# Patient Record
Sex: MALE | Race: WHITE | NOT HISPANIC OR LATINO | Employment: PART TIME | URBAN - METROPOLITAN AREA
[De-identification: names, ages, dates, MRNs, and addresses within clinical notes are randomized per-mention and may not be internally consistent; named-entity substitution may affect disease eponyms.]

---

## 2017-09-27 ENCOUNTER — OFFICE VISIT (OUTPATIENT)
Dept: SURGERY | Facility: CLINIC | Age: 22
End: 2017-09-27
Payer: COMMERCIAL

## 2017-09-27 VITALS
SYSTOLIC BLOOD PRESSURE: 117 MMHG | BODY MASS INDEX: 23.59 KG/M2 | HEART RATE: 75 BPM | WEIGHT: 178 LBS | DIASTOLIC BLOOD PRESSURE: 66 MMHG | HEIGHT: 73 IN

## 2017-09-27 DIAGNOSIS — K64.9 HEMORRHOIDS, UNSPECIFIED HEMORRHOID TYPE: Primary | ICD-10-CM

## 2017-09-27 PROCEDURE — 99203 OFFICE O/P NEW LOW 30 MIN: CPT | Performed by: SURGERY

## 2017-09-27 NOTE — MR AVS SNAPSHOT
"              After Visit Summary   2017    Edvin Tuttle    MRN: [id number]           Patient Information     Date Of Birth          1995        Visit Information        Provider Department      2017 1:00 PM Magan Rodríguez MD [facility]        Today's Diagnoses     Hemorrhoids, unspecified hemorrhoid type    -  1      Care Instructions    Per Physician's instructions            Follow-ups after your visit        Who to contact     If you have questions or need follow up information about today's clinic visit or your schedule please contact [facility] directly at [phone].  Normal or non-critical lab and imaging results will be communicated to you by ComplyMDhart, letter or phone within 4 business days after the clinic has received the results. If you do not hear from us within 7 days, please contact the clinic through ComplyMDhart or phone. If you have a critical or abnormal lab result, we will notify you by phone as soon as possible.  Submit refill requests through [employer] or call your pharmacy and they will forward the refill request to us. Please allow 3 business days for your refill to be completed.          Additional Information About Your Visit        MyChart Information     [employer] lets you send messages to your doctor, view your test results, renew your prescriptions, schedule appointments and more. To sign up, go to [email or web address] . Click on \"Log in\" on the left side of the screen, which will take you to the Welcome page. Then click on \"Sign up Now\" on the right side of the page.     You will be asked to enter the access code listed below, as well as some personal information. Please follow the directions to create your username and password.     Your access code is: [email or web address]  Expires: 2017  1:31 PM     Your access code will  in 90 days. If you need help or a new code, please call your [facility] or [phone].        Care EveryWhere " "ID     This is your Care EveryWhere ID. This could be used by other organizations to access your [place] medical records  [id number]        Your Vitals Were     Pulse Height BMI (Body Mass Index)             75 1.854 m (6' 1\") 23.48 kg/m2          Blood Pressure from Last 3 Encounters:   09/27/17 117/66   01/16/13 144/78   01/04/13 114/72    Weight from Last 3 Encounters:   09/27/17 80.7 kg (178 lb)   01/04/13 91.1 kg (200 lb 12.8 oz) (95 %)*   10/20/11 88.2 kg (194 lb 6.4 oz) (96 %)*     * Growth percentiles are based on CDC 2-20 Years data.              Today, you had the following     No orders found for display       Primary Care Provider Office Phone # Fax #    Zach Robison [phone] [address] [facility] [address]        Equal Access to Services     CLARITZA TRONCOSO : Hadii aad ku hadasho Soomaali, waaxda luqadaha, qaybta kaalmada adeegyada, waxay charlie valencia . So [facility] [phone].    ATENCIÓN: Si habla español, tiene a renteria disposición servicios gratuitos de asistencia lingüística. Llame al [phone].    We comply with applicable federal civil rights laws and [place] laws. We do not discriminate on the basis of race, color, national origin, age, disability sex, sexual orientation or gender identity.            Thank you!     Thank you for choosing [facility]  for your care. Our goal is always to provide you with excellent care. Hearing back from our patients is one way we can continue to improve our services. Please take a few minutes to complete the written survey that you may receive in the mail after your visit with us. Thank you!             Your Updated Medication List - Protect others around you: Learn how to safely use, store and throw away your medicines at www.disposemymeds.org.          This list is accurate as of: 9/27/17  1:31 PM.  Always use your most recent med list.                   Brand Name Dispense Instructions " for use Diagnosis    NO ACTIVE MEDICATIONS

## 2017-09-27 NOTE — NURSING NOTE
"Chief Complaint   Patient presents with     Consult     abscess        Initial /66 (BP Location: Left arm, Patient Position: Chair, Cuff Size: Adult Regular)  Pulse 75  Ht 1.854 m (6' 1\")  Wt 80.7 kg (178 lb)  BMI 23.48 kg/m2 Estimated body mass index is 23.48 kg/(m^2) as calculated from the following:    Height as of this encounter: 1.854 m (6' 1\").    Weight as of this encounter: 80.7 kg (178 lb).  BP completed using cuff size: regular      Marva Peter CMA     "

## 2017-09-27 NOTE — PROGRESS NOTES
Patient referred by Dr. Robison  for evaluation of perianal pain.    22-year-old male complaining of weeklong history of perianal pain. Patient reports the pain came on slowly but is constant. Localized discomfort 1 out of 10 without radiation. Patient has never been diagnosed with hemorrhoids and is concerned this could be an abscess. He denies fevers chills nausea and vomiting. Patient reports normal bowel movements but does not take fiber supplements.    Patient Active Problem List   Diagnosis     Pneumonia       History reviewed. No pertinent past medical history.    History reviewed. No pertinent surgical history.    Family History   Problem Relation Age of Onset     Musculoskeletal Disorder Father      ALS     Alzheimer Disease Maternal Grandmother      CANCER Maternal Grandfather      lung     CANCER Paternal Grandfather      Lung       Social History   Substance Use Topics     Smoking status: Never Smoker     Smokeless tobacco: Never Used      Comment: Parents smoke outside.     Alcohol use No        History   Drug Use No       Current Outpatient Prescriptions   Medication Sig Dispense Refill     NO ACTIVE MEDICATIONS          No Known Allergies    ROS  CV - No CP or SOB  Resp - No SOB or dyspnea  GI - No nausea or vomiting   - No difficulty with urination      Exam:  AXO3 NAD  Neuro - Strength 5/5 all major groups, sensation intact, PERRL  Lungs - CTA  CV - RRR  Abd - Soft, non-distended, non-tender, +BS  Rectal-slightly enlarged external hemorrhoid, tender to palpation, no surrounding erythema.  Extr - No edema    Assessment and plan: 22-year-old male with enlarged external hemorrhoid. I explained the pathophysiology of hemorrhoid formation and the importance of fiber in the diet. We talked about sitz baths as Preparation H Anusol and Tucks pads. I explained that this hemorrhoid is not thrombosed as that would be quite painful. I also stated that surgery is a last choice option for hemorrhoids. Patient  understood and will start a high-fiber diet and use sitz baths to control this.    Magan Rodríguez MD

## 2017-10-16 ENCOUNTER — OFFICE VISIT (OUTPATIENT)
Dept: FAMILY MEDICINE | Facility: CLINIC | Age: 22
End: 2017-10-16
Payer: COMMERCIAL

## 2017-10-16 VITALS
OXYGEN SATURATION: 97 % | WEIGHT: 177 LBS | BODY MASS INDEX: 23.46 KG/M2 | HEIGHT: 73 IN | DIASTOLIC BLOOD PRESSURE: 70 MMHG | SYSTOLIC BLOOD PRESSURE: 136 MMHG | TEMPERATURE: 98.2 F | HEART RATE: 77 BPM

## 2017-10-16 DIAGNOSIS — R07.0 THROAT PAIN: ICD-10-CM

## 2017-10-16 DIAGNOSIS — J01.90 ACUTE SINUSITIS WITH SYMPTOMS > 10 DAYS: Primary | ICD-10-CM

## 2017-10-16 DIAGNOSIS — R06.09 DOE (DYSPNEA ON EXERTION): ICD-10-CM

## 2017-10-16 DIAGNOSIS — J30.2 CHRONIC SEASONAL ALLERGIC RHINITIS, UNSPECIFIED TRIGGER: ICD-10-CM

## 2017-10-16 LAB
DEPRECATED S PYO AG THROAT QL EIA: NORMAL
SPECIMEN SOURCE: NORMAL

## 2017-10-16 PROCEDURE — 87880 STREP A ASSAY W/OPTIC: CPT | Performed by: INTERNAL MEDICINE

## 2017-10-16 PROCEDURE — 87081 CULTURE SCREEN ONLY: CPT | Performed by: INTERNAL MEDICINE

## 2017-10-16 PROCEDURE — 99214 OFFICE O/P EST MOD 30 MIN: CPT | Performed by: INTERNAL MEDICINE

## 2017-10-16 RX ORDER — FLUTICASONE PROPIONATE 50 MCG
1-2 SPRAY, SUSPENSION (ML) NASAL DAILY
Qty: 1 BOTTLE | Refills: 11 | Status: SHIPPED | OUTPATIENT
Start: 2017-10-16 | End: 2020-03-03

## 2017-10-16 NOTE — NURSING NOTE
"Chief Complaint   Patient presents with     Pharyngitis     x 1.5 weeks, fever at the onset. - wondering about asthma       Initial /70 (BP Location: Left arm, Patient Position: Chair, Cuff Size: Adult Regular)  Pulse 77  Temp 98.2  F (36.8  C) (Tympanic)  Ht 6' 1\" (1.854 m)  Wt 177 lb (80.3 kg)  SpO2 97%  BMI 23.35 kg/m2 Estimated body mass index is 23.35 kg/(m^2) as calculated from the following:    Height as of this encounter: 6' 1\" (1.854 m).    Weight as of this encounter: 177 lb (80.3 kg).  Medication Reconciliation: complete   Nahomi JACK CMA ([facility])    "

## 2017-10-16 NOTE — PATIENT INSTRUCTIONS
I have ordered a lung function test (PFT).  Once you are feeling back to baseline, please call [phone] to schedule this.      Try some Flonase to help your sinuses.  When spraying into the nose, aim towards the ears for the best effect.      Check with insurance about allergy testing- if you want to pursue this in the future, let me know and I can place a referral to the allergist.

## 2017-10-16 NOTE — MR AVS SNAPSHOT
After Visit Summary   10/16/2017    Edvin Tuttle    MRN: [id number]           Patient Information     Date Of Birth          1995        Visit Information        Provider Department      10/16/2017 11:20 AM Hill Arechiga MD [facility]        Today's Diagnoses     Throat pain    -  1    SU (dyspnea on exertion)        Chronic seasonal allergic rhinitis, unspecified trigger        Acute sinusitis with symptoms > 10 days          Care Instructions    I have ordered a lung function test (PFT).  Once you are feeling back to baseline, please call [phone] to schedule this.      Try some Flonase to help your ear drain.  When spraying into the nose, aim towards the ears for the best effect.      Check with insurance about allergy testing- if you want to pursue this in the future, let me know and I can place a referral to the allergist.            Follow-ups after your visit        Future tests that were ordered for you today     Open Future Orders        Priority Expected Expires Ordered    General PFT Lab (Please always keep checked) Routine  10/16/2018 10/16/2017    Pulmonary Function Test Routine  1/2/2027 10/16/2017            Who to contact     If you have questions or need follow up information about today's clinic visit or your schedule please contact [facility] directly at [phone].  Normal or non-critical lab and imaging results will be communicated to you by MyChart, letter or phone within 4 business days after the clinic has received the results. If you do not hear from us within 7 days, please contact the clinic through [employer] or phone. If you have a critical or abnormal lab result, we will notify you by phone as soon as possible.  Submit refill requests through [employer] or call your pharmacy and they will forward the refill request to us. Please allow 3 business days for your refill to be completed.          Additional Information About Your Visit    "     MyChart Information     [employer] lets you send messages to your doctor, view your test results, renew your prescriptions, schedule appointments and more. To sign up, go to [email or web address] . Click on \"Log in\" on the left side of the screen, which will take you to the Welcome page. Then click on \"Sign up Now\" on the right side of the page.     You will be asked to enter the access code listed below, as well as some personal information. Please follow the directions to create your username and password.     Your access code is: [email or web address]  Expires: 2017  1:31 PM     Your access code will  in 90 days. If you need help or a new code, please call your [place] clinic or [phone].        Care EveryWhere ID     This is your Care EveryWhere ID. This could be used by other organizations to access your [place] medical records  [id number]        Your Vitals Were     Pulse Temperature Height Pulse Oximetry BMI (Body Mass Index)       77 98.2  F (36.8  C) (Tympanic) 6' 1\" (1.854 m) 97% 23.35 kg/m2        Blood Pressure from Last 3 Encounters:   10/16/17 136/70   17 117/66   13 144/78    Weight from Last 3 Encounters:   10/16/17 177 lb (80.3 kg)   17 178 lb (80.7 kg)   13 200 lb 12.8 oz (91.1 kg) (95 %)*     * Growth percentiles are based on [facility] 2-20 Years data.              We Performed the Following     Beta strep group A culture     Strep, Rapid Screen          Today's Medication Changes          These changes are accurate as of: 10/16/17 11:47 AM.  If you have any questions, ask your nurse or doctor.               Start taking these medicines.        Dose/Directions    amoxicillin-clavulanate 875-125 MG per tablet   Commonly known as:  AUGMENTIN   Used for:  Acute sinusitis with symptoms > 10 days   Started by:  Hill Arechiga MD        Dose:  1 tablet   Take 1 tablet by mouth 2 times daily for 7 days   Quantity:  14 tablet   Refills:  0       fluticasone 50 MCG/ACT " spray   Commonly known as:  FLONASE   Used for:  Chronic seasonal allergic rhinitis, unspecified trigger   Started by:  Hill Arechiga MD        Dose:  1-2 spray   Spray 1-2 sprays into both nostrils daily   Quantity:  1 Bottle   Refills:  11            Where to get your medicines      These medications were sent to [facility] - [place], [place] - [address]  [address] [address]     Phone:  [phone]     amoxicillin-clavulanate 875-125 MG per tablet    fluticasone 50 MCG/ACT spray                Primary Care Provider Office Phone # Fax #    Zach Deonte Robison [phone]       [address] [facility] [address]        Equal Access to Services     CLARITZA TRONCOSO : Hadii gloria joeo Sodom, waaxda luqadaha, qaybta kaalmada adeegyada, magdalena padilla. So [facility] [phone].    ATENCIÓN: Si habla español, tiene a renteria disposición servicios gratuitos de asistencia lingüística. [facility] [phone].    We comply with applicable federal civil rights laws and [place] laws. We do not discriminate on the basis of race, color, national origin, age, disability, sex, sexual orientation, or gender identity.            Thank you!     Thank you for choosing [facility]  for your care. Our goal is always to provide you with excellent care. Hearing back from our patients is one way we can continue to improve our services. Please take a few minutes to complete the written survey that you may receive in the mail after your visit with us. Thank you!             Your Updated Medication List - Protect others around you: Learn how to safely use, store and throw away your medicines at www.disposemymeds.org.          This list is accurate as of: 10/16/17 11:47 AM.  Always use your most recent med list.                   Brand Name Dispense Instructions for use Diagnosis    amoxicillin-clavulanate 875-125 MG per tablet    AUGMENTIN    14  tablet    Take 1 tablet by mouth 2 times daily for 7 days    Acute sinusitis with symptoms > 10 days       fluticasone 50 MCG/ACT spray    FLONASE    1 Bottle    Spray 1-2 sprays into both nostrils daily    Chronic seasonal allergic rhinitis, unspecified trigger       NO ACTIVE MEDICATIONS

## 2017-10-16 NOTE — PROGRESS NOTES
SUBJECTIVE:   Edvin Tuttle is a 22 year old male who presents to clinic today for the following health issues:  Chief Complaint   Patient presents with     Pharyngitis     x 1.5 weeks, fever at the onset. - wondering about asthma       ENT Symptoms             Symptoms: cc Present Absent Comment   Fever/Chills   x Fever at the onset    Fatigue  x     Muscle Aches  x  At the onset, not so bad now    Eye Irritation  x  itchy   Sneezing  x     Nasal Joey/Drg  x     Sinus Pressure/Pain  x     Loss of smell  x     Dental pain   x    Sore Throat x x  Losing voice    Swollen Glands       Ear Pain/Fullness   x    Cough  x     Wheeze  x     Chest Pain   x    Shortness of breath  x     Rash   x    Other  x  Headache      Symptom duration:  1.5 weeks    Symptom severity:     Treatments tried:  Nyquil, Mucin-ex    Contacts:  works at [employer] school        Edvin has a history of seasonal allergies but has not had formal testing.  Has not found much relief in OTC antihistamines, has never tried a nasal spray.  He also wonders if he may have mild asthma.  Has a history of SOB with exercise that is greater than he would expect.  Has never had PFTs or used an inhaler.      Problem list and histories reviewed & adjusted, as indicated.  Additional history: as documented    Current Outpatient Prescriptions   Medication Sig Dispense Refill     fluticasone (FLONASE) 50 MCG/ACT spray Spray 1-2 sprays into both nostrils daily 1 Bottle 11     amoxicillin-clavulanate (AUGMENTIN) 875-125 MG per tablet Take 1 tablet by mouth 2 times daily for 7 days 14 tablet 0     NO ACTIVE MEDICATIONS        No Known Allergies    Reviewed and updated as needed this visit by clinical staffTobacco  Allergies  Med Hx  Surg Hx  Fam Hx  Soc Hx      Reviewed and updated as needed this visit by Provider         ROS:  Constitutional, HEENT, pulmonary systems are negative, except as otherwise noted.      OBJECTIVE:   /70 (BP Location: Left arm,  "Patient Position: Chair, Cuff Size: Adult Regular)  Pulse 77  Temp 98.2  F (36.8  C) (Tympanic)  Ht 6' 1\" (1.854 m)  Wt 177 lb (80.3 kg)  SpO2 97%  BMI 23.35 kg/m2  Body mass index is 23.35 kg/(m^2).    GENERAL: alert and no distress  EYES: Eyes grossly normal to inspection, PERRL and conjunctivae and sclerae normal  HENT: ear canals and TMs normal, sinuses mildly tender to percussion, nose and mouth without ulcers or lesions, oropharynx red but no tonsillar exudates  NECK: no adenopathy, no asymmetry, masses, or scars  RESP: lungs clear to auscultation - no rales, rhonchi or wheezes  CV: regular rate and rhythm, normal S1 S2, no S3 or S4, no murmur, click or rub      ASSESSMENT/PLAN:       1. Acute sinusitis with symptoms > 10 days  2. Throat pain    Edvin presents with upper respiratory and sinus symptoms of 1.5 weeks duration, so we will treat with Augmentin for possible bacterial sinusitis.  Rapid strep was negative.    - amoxicillin-clavulanate (AUGMENTIN) 875-125 MG per tablet; Take 1 tablet by mouth 2 times daily for 7 days  Dispense: 14 tablet; Refill: 0  - Strep, Rapid Screen  - Beta strep group A culture    3. SU (dyspnea on exertion)    He reports a long history of SOB with exercise and would like to be tested for asthma- PFTs ordered.    - General PFT Lab (Please always keep checked); Future  - Pulmonary Function Test; Future    4. Chronic seasonal allergic rhinitis, unspecified trigger    Has never been tested for allergies- he is interested in testing but isn't sure if insurance will cover this, so he is going to check.  Has not had much improvement with OTC antihistamines, is currently not taking one.  Recommended trying some Flonase.     - fluticasone (FLONASE) 50 MCG/ACT spray; Spray 1-2 sprays into both nostrils daily  Dispense: 1 Bottle; Refill: 11    Follow-up as needed if not improving.     Hill Arechiga MD  [facility]  "

## 2017-10-17 LAB
BACTERIA SPEC CULT: ABNORMAL
SPECIMEN SOURCE: ABNORMAL

## 2017-12-04 ENCOUNTER — OFFICE VISIT (OUTPATIENT)
Dept: FAMILY MEDICINE | Facility: CLINIC | Age: 22
End: 2017-12-04
Payer: COMMERCIAL

## 2017-12-04 VITALS
WEIGHT: 174 LBS | HEART RATE: 70 BPM | DIASTOLIC BLOOD PRESSURE: 74 MMHG | TEMPERATURE: 96 F | OXYGEN SATURATION: 97 % | SYSTOLIC BLOOD PRESSURE: 106 MMHG | BODY MASS INDEX: 23.06 KG/M2 | HEIGHT: 73 IN

## 2017-12-04 DIAGNOSIS — R05.3 CHRONIC COUGH: Primary | ICD-10-CM

## 2017-12-04 DIAGNOSIS — R07.0 THROAT PAIN: ICD-10-CM

## 2017-12-04 LAB
DEPRECATED S PYO AG THROAT QL EIA: NORMAL
SPECIMEN SOURCE: NORMAL

## 2017-12-04 PROCEDURE — 99213 OFFICE O/P EST LOW 20 MIN: CPT | Performed by: FAMILY MEDICINE

## 2017-12-04 PROCEDURE — 87880 STREP A ASSAY W/OPTIC: CPT | Performed by: FAMILY MEDICINE

## 2017-12-04 PROCEDURE — 87081 CULTURE SCREEN ONLY: CPT | Performed by: FAMILY MEDICINE

## 2017-12-04 RX ORDER — ALBUTEROL SULFATE 90 UG/1
2 AEROSOL, METERED RESPIRATORY (INHALATION) EVERY 4 HOURS PRN
Qty: 1 INHALER | Refills: 3 | Status: SHIPPED | OUTPATIENT
Start: 2017-12-04 | End: 2020-03-03

## 2017-12-04 NOTE — NURSING NOTE
"Chief Complaint   Patient presents with     Pharyngitis       Initial /74 (BP Location: Left arm, Cuff Size: Adult Regular)  Pulse 70  Temp 96  F (35.6  C) (Tympanic)  Ht 6' 1\" (1.854 m)  Wt 174 lb (78.9 kg)  SpO2 97%  BMI 22.96 kg/m2 Estimated body mass index is 22.96 kg/(m^2) as calculated from the following:    Height as of this encounter: 6' 1\" (1.854 m).    Weight as of this encounter: 174 lb (78.9 kg).  Medication Reconciliation: complete  "

## 2017-12-04 NOTE — LETTER
December 5, 2017      Edvin Tuttle  [address] [facility] [address]            The results of your recent throat culture were negative.  If you have any further questions or concerns please contact the clinic            Sincerely,        Gudelia Arriaga MD/ls

## 2017-12-04 NOTE — MR AVS SNAPSHOT
After Visit Summary   12/4/2017    Edvin Tuttle    MRN: [id number]           Patient Information     Date Of Birth          1995        Visit Information        Provider Department      12/4/2017 11:00 AM Gudelia Arriaga MD [facility]        Today's Diagnoses     Throat pain    -  1    Chronic cough          Care Instructions          Thank you for choosing [facility].  You may be receiving a survey in the mail from [employer] regarding your visit today.  Please take a few minutes to complete and return the survey to let us know how we are doing.      If you have questions or concerns, please contact us via PubNub or you can contact your care team at [phone].    Our Clinic hours are:  Monday 6:40 am  to 7:00 pm  Tuesday -Friday 6:40 am to 5:00 pm    The [place] outpatient lab hours are:  Monday - Friday 6:10 am to 4:45 pm  Saturdays 7:00 am to 11:00 am  Appointments are required, call [phone]    If you have clinical questions after hours or would like to schedule an appointment,  call the clinic at [phone].  Allergic Rhinitis  Allergic rhinitis is an allergic reaction that affects the nose, and often the eyes. It s often known as nasal allergies. Nasal allergies are often due to things in the environment that are breathed in. Depending what you are sensitive to, nasal allergies may occur only during certain seasons. Or they may occur year round. Common indoor allergens include house dust mites, mold, cockroaches, and pet dander. Outdoor allergens include pollen from trees, grasses, and weeds.   Symptoms include a drippy, stuffy, and itchy nose. They also include sneezing and red and itchy eyes. You may feel tired more often. Severe allergies may also affect your breathing and trigger a condition called asthma.   Tests can be done to see what allergens are affecting you. You may be referred to an allergy specialist for testing and further  evaluation.  Home care  Your healthcare provider may prescribe medicines to help relieve allergy symptoms. These may include oral medicines, nasal sprays, or eye drops.  Ask your provider for advice on how to avoid substances that you are allergic to. Below are a few tips for each type of allergen.  Pet dander:    Do not have pets with fur and feathers.    If you can't avoid having a pet, keep it out of your bedroom and off upholstered furniture.  Pollen:    When pollen counts are high, keep windows of your car and home closed. If possible, use an air conditioner instead.    Wear a filter mask when mowing or doing yard work.  House dust mites:    Wash bedding every week in warm water and detergent and dry on a hot setting.    Cover the mattress, box spring, and pillows with allergy covers.     If possible, sleep in a room with no carpet, curtains, or upholstered furniture.  Cockroaches:    Store food in sealed containers.    Remove garbage from the home promptly.    Fix water leaks  Mold:    Keep humidity low by using a dehumidifier or air conditioner. Keep the dehumidifier and air conditioner clean and free of mold.    Clean moldy areas with bleach and water.  In general:    Vacuum once or twice a week. If possible, use a vacuum with a high-efficiency particulate air (HEPA) filter.    Do not smoke. Avoid cigarette smoke. Cigarette smoke is an irritant that can make symptoms worse.  Follow-up care  Follow up as advised by the healthcare provider or our staff. If you were referred to an allergy specialist, make this appointment promptly.  When to seek medical advice  Call your healthcare provider right away if the following occur:    Coughing or wheezing    Fever greater than 100.4 F (38 C)    Hives (raised red bumps)    Continuing symptoms, new symptoms, or worsening symptoms  Call 911 right away if you have:    Trouble breathing    Severe swelling of the face or severe itching of the eyes or mouth  Date Last  Reviewed: 3/1/2017    4355-9594 [employer]. [address] [place] [address], [place] [address]. All rights reserved. This information is not intended as a substitute for professional medical care. Always follow your healthcare professional's instructions.                Follow-ups after your visit        Additional Services     ALLERGY/ASTHMA ADULT REFERRAL       Your provider has referred you to: NUZHAT: [facility] [phone] [email or web address]    Please be aware that coverage of these services is subject to the terms and limitations of your health insurance plan.  Call member services at your health plan with any benefit or coverage questions.      Please bring the following with you to your appointment:    (1) Any X-Rays, CTs or MRIs which have been performed.  Contact the facility where they were done to arrange for  prior to your scheduled appointment.    (2) List of current medications  (3) This referral request   (4) Any documents/labs given to you for this referral                  Who to contact     If you have questions or need follow up information about today's clinic visit or your schedule please contact [facility] directly at [phone].  Normal or non-critical lab and imaging results will be communicated to you by MyChart, letter or phone within 4 business days after the clinic has received the results. If you do not hear from us within 7 days, please contact the clinic through [employer] or phone. If you have a critical or abnormal lab result, we will notify you by phone as soon as possible.  Submit refill requests through Birdi or call your pharmacy and they will forward the refill request to us. Please allow 3 business days for your refill to be completed.          Additional Information About Your Visit        [employer] Information     Birdi lets you send messages to your doctor, view your test results, renew your  "prescriptions, schedule appointments and more. To sign up, go to [email or web address] . Click on \"Log in\" on the left side of the screen, which will take you to the Welcome page. Then click on \"Sign up Now\" on the right side of the page.     You will be asked to enter the access code listed below, as well as some personal information. Please follow the directions to create your username and password.     Your access code is: [email or web address]  Expires: 2017 12:31 PM     Your access code will  in 90 days. If you need help or a new code, please call your [place] clinic or [phone].        Care EveryWhere ID     This is your Care EveryWhere ID. This could be used by other organizations to access your [place] medical records  [id number]        Your Vitals Were     Pulse Temperature Height Pulse Oximetry BMI (Body Mass Index)       70 96  F (35.6  C) (Tympanic) 6' 1\" (1.854 m) 97% 22.96 kg/m2        Blood Pressure from Last 3 Encounters:   17 106/74   10/16/17 136/70   17 117/66    Weight from Last 3 Encounters:   17 174 lb (78.9 kg)   10/16/17 177 lb (80.3 kg)   17 178 lb (80.7 kg)              We Performed the Following     ALLERGY/ASTHMA ADULT REFERRAL     Beta strep group A culture     Rapid strep screen          Today's Medication Changes          These changes are accurate as of: 17 11:19 AM.  If you have any questions, ask your nurse or doctor.               Start taking these medicines.        Dose/Directions    albuterol 108 (90 BASE) MCG/ACT Inhaler   Commonly known as:  PROAIR HFA/PROVENTIL HFA/VENTOLIN HFA   Used for:  Chronic cough   Started by:  Gudelia Arriaga MD        Dose:  2 puff   Inhale 2 puffs into the lungs every 4 hours as needed for shortness of breath / dyspnea   Quantity:  1 Inhaler   Refills:  3            Where to get your medicines      These medications were sent to [facility] [place], [place] - [address] [place] [address]  [address] [facility] [address]     Phone:  [phone]     albuterol 108 (90 BASE) MCG/ACT Inhaler                Primary Care Provider Office Phone # Fax #    Zach Robison [phone] [address] [facility] [address]        Equal Access to Services     CLARITZA TRONCOSO : Hadii aad ku hadasho Soomaali, waaxda luqadaha, qaybta kaalmada adeegyada, waxay idiin hayaan adeeg kharash la'aan . So [facility] [phone].    ATENCIÓN: Si habla español, tiene a renteria disposición servicios gratuitos de asistencia lingüística. Chuckame al [phone].    We comply with applicable federal civil rights laws and [place] laws. We do not discriminate on the basis of race, color, national origin, age, disability, sex, sexual orientation, or gender identity.            Thank you!     Thank you for choosing [facility]  for your care. Our goal is always to provide you with excellent care. Hearing back from our patients is one way we can continue to improve our services. Please take a few minutes to complete the written survey that you may receive in the mail after your visit with us. Thank you!             Your Updated Medication List - Protect others around you: Learn how to safely use, store and throw away your medicines at www.disposemymeds.org.          This list is accurate as of: 12/4/17 11:19 AM.  Always use your most recent med list.                   Brand Name Dispense Instructions for use Diagnosis    albuterol 108 (90 BASE) MCG/ACT Inhaler    PROAIR HFA/PROVENTIL HFA/VENTOLIN HFA    1 Inhaler    Inhale 2 puffs into the lungs every 4 hours as needed for shortness of breath / dyspnea    Chronic cough       fluticasone 50 MCG/ACT spray    FLONASE    1 Bottle    Spray 1-2 sprays into both nostrils daily    Chronic seasonal allergic rhinitis, unspecified trigger       NO ACTIVE MEDICATIONS

## 2017-12-04 NOTE — PATIENT INSTRUCTIONS
Thank you for choosing [facility].  You may be receiving a survey in the mail from Luanne Hwang regarding your visit today.  Please take a few minutes to complete and return the survey to let us know how we are doing.      If you have questions or concerns, please contact us via Sanitors or you can contact your care team at [phone].    Our Clinic hours are:  Monday 6:40 am  to 7:00 pm  Tuesday -Friday 6:40 am to 5:00 pm    The [place] outpatient lab hours are:  Monday - Friday 6:10 am to 4:45 pm  Saturdays 7:00 am to 11:00 am  Appointments are required, call [phone]    If you have clinical questions after hours or would like to schedule an appointment,  call the clinic at [phone].  Allergic Rhinitis  Allergic rhinitis is an allergic reaction that affects the nose, and often the eyes. It s often known as nasal allergies. Nasal allergies are often due to things in the environment that are breathed in. Depending what you are sensitive to, nasal allergies may occur only during certain seasons. Or they may occur year round. Common indoor allergens include house dust mites, mold, cockroaches, and pet dander. Outdoor allergens include pollen from trees, grasses, and weeds.   Symptoms include a drippy, stuffy, and itchy nose. They also include sneezing and red and itchy eyes. You may feel tired more often. Severe allergies may also affect your breathing and trigger a condition called asthma.   Tests can be done to see what allergens are affecting you. You may be referred to an allergy specialist for testing and further evaluation.  Home care  Your healthcare provider may prescribe medicines to help relieve allergy symptoms. These may include oral medicines, nasal sprays, or eye drops.  Ask your provider for advice on how to avoid substances that you are allergic to. Below are a few tips for each type of allergen.  Pet dander:    Do not have pets with fur and feathers.    If you can't avoid having a  pet, keep it out of your bedroom and off upholstered furniture.  Pollen:    When pollen counts are high, keep windows of your car and home closed. If possible, use an air conditioner instead.    Wear a filter mask when mowing or doing yard work.  House dust mites:    Wash bedding every week in warm water and detergent and dry on a hot setting.    Cover the mattress, box spring, and pillows with allergy covers.     If possible, sleep in a room with no carpet, curtains, or upholstered furniture.  Cockroaches:    Store food in sealed containers.    Remove garbage from the home promptly.    Fix water leaks  Mold:    Keep humidity low by using a dehumidifier or air conditioner. Keep the dehumidifier and air conditioner clean and free of mold.    Clean moldy areas with bleach and water.  In general:    Vacuum once or twice a week. If possible, use a vacuum with a high-efficiency particulate air (HEPA) filter.    Do not smoke. Avoid cigarette smoke. Cigarette smoke is an irritant that can make symptoms worse.  Follow-up care  Follow up as advised by the healthcare provider or our staff. If you were referred to an allergy specialist, make this appointment promptly.  When to seek medical advice  Call your healthcare provider right away if the following occur:    Coughing or wheezing    Fever greater than 100.4 F (38 C)    Hives (raised red bumps)    Continuing symptoms, new symptoms, or worsening symptoms  Call 911 right away if you have:    Trouble breathing    Severe swelling of the face or severe itching of the eyes or mouth  Date Last Reviewed: 3/1/2017    7439-1637 [employer]. [address] [place] [address], [place] [address]. All rights reserved. This information is not intended as a substitute for professional medical care. Always follow your healthcare professional's instructions.

## 2017-12-04 NOTE — PROGRESS NOTES
SUBJECTIVE:   Edvin Tuttle is a 22 year old male who presents to clinic today for the following health issues:      ENT Symptoms             Symptoms: cc Present Absent Comment   Fever/Chills   x    Fatigue   x    Muscle Aches   x    Eye Irritation   x    Sneezing   x    Nasal Joey/Drg  x     Sinus Pressure/Pain  x     Loss of smell   x    Dental pain   x    Sore Throat  x     Swollen Glands   x    Ear Pain/Fullness   x    Cough  x     Wheeze   x    Chest Pain   x    Shortness of breath   x    Rash   x    Other         Symptom duration:  1 week    Symptom severity:  moderate   Treatments tried:  Patient finished antibiotic 1 week ago for strep     Contacts:  none        22 yr old male here for chronic cough ongoing for a couple of months. He described the cough as dry harsh cough occassionally productive of phlegm. He also described the feeling of his sinuses being congested at all times. He also described feeling of nasal congestion and feeling like he needs to blow his nose really hard at all He has suffered from these symptoms over the last few years, he denied any childhood asthma . Patient states that he has tried over the counter allergy medication and Flonase with no relief. He was seen a couple of weeks ago and was treated for strep. Has a sore throat today but it is not as severe as the last time. Still has a cough that he can't seem to shake off. Denies any tobacco use.         Problem list and histories reviewed & adjusted, as indicated.  Additional history: as documented    Patient Active Problem List   Diagnosis     Pneumonia     Chronic seasonal allergic rhinitis, unspecified trigger     History reviewed. No pertinent surgical history.    Social History   Substance Use Topics     Smoking status: Never Smoker     Smokeless tobacco: Never Used      Comment: Parents smoke outside.     Alcohol use Yes     Family History   Problem Relation Age of Onset     Musculoskeletal Disorder Father      ALS      "Alzheimer Disease Maternal Grandmother      CANCER Maternal Grandfather      lung     CANCER Paternal Grandfather      Lung     Other - See Comments Mother 65     throat cancer         Current Outpatient Prescriptions   Medication Sig Dispense Refill     albuterol (PROAIR HFA/PROVENTIL HFA/VENTOLIN HFA) 108 (90 BASE) MCG/ACT Inhaler Inhale 2 puffs into the lungs every 4 hours as needed for shortness of breath / dyspnea 1 Inhaler 3     fluticasone (FLONASE) 50 MCG/ACT spray Spray 1-2 sprays into both nostrils daily 1 Bottle 11     NO ACTIVE MEDICATIONS        No Known Allergies  BP Readings from Last 3 Encounters:   12/04/17 106/74   10/16/17 136/70   09/27/17 117/66    Wt Readings from Last 3 Encounters:   12/04/17 174 lb (78.9 kg)   10/16/17 177 lb (80.3 kg)   09/27/17 178 lb (80.7 kg)                  Labs reviewed in EPIC        Reviewed and updated as needed this visit by clinical staffTobacco  Allergies  Med Hx  Surg Hx  Fam Hx  Soc Hx      Reviewed and updated as needed this visit by Provider         ROS:  Constitutional, HEENT, cardiovascular, pulmonary, gi and gu systems are negative, except as otherwise noted.      OBJECTIVE:   /74 (BP Location: Left arm, Cuff Size: Adult Regular)  Pulse 70  Temp 96  F (35.6  C) (Tympanic)  Ht 6' 1\" (1.854 m)  Wt 174 lb (78.9 kg)  SpO2 97%  BMI 22.96 kg/m2  Body mass index is 22.96 kg/(m^2).  GENERAL: healthy, alert and no distress  EYES: Eyes grossly normal to inspection, PERRL and conjunctivae and sclerae normal  HENT: ear canals and TM's normal, nose and mouth without ulcers or lesions  NECK: no adenopathy, no asymmetry, masses, or scars and thyroid normal to palpation  RESP: no rhonchi  CV: regular rate and rhythm, normal S1 S2, no S3 or S4, no murmur, click or rub, no peripheral edema and peripheral pulses strong  MS: no gross musculoskeletal defects noted, no edema    Diagnostic Test Results:  Results for orders placed or performed in visit on " 12/04/17 (from the past 24 hour(s))   Rapid strep screen   Result Value Ref Range    Specimen Description Throat     Rapid Strep A Screen       NEGATIVE: No Group A streptococcal antigen detected by immunoassay, await culture report.       ASSESSMENT/PLAN:   (R05) Chronic cough  (primary encounter diagnosis)  Comment: Patient referred to allergist .  Plan: albuterol (PROAIR HFA/PROVENTIL HFA/VENTOLIN         HFA) 108 (90 BASE) MCG/ACT Inhaler,         ALLERGY/ASTHMA ADULT REFERRAL      (R07.0) Throat pain  Comment: Strep screen  negative   Plan: Rapid strep screen, Beta strep group A culture      FUTURE APPOINTMENTS:       - Follow-up visit as needed    Gudelia Arriaga MD  [facility]

## 2017-12-05 LAB
BACTERIA SPEC CULT: NORMAL
SPECIMEN SOURCE: NORMAL

## 2019-02-28 ENCOUNTER — OFFICE VISIT (OUTPATIENT)
Dept: FAMILY MEDICINE | Facility: CLINIC | Age: 24
End: 2019-02-28
Payer: COMMERCIAL

## 2019-02-28 VITALS
TEMPERATURE: 97 F | WEIGHT: 186 LBS | OXYGEN SATURATION: 99 % | SYSTOLIC BLOOD PRESSURE: 130 MMHG | BODY MASS INDEX: 24.65 KG/M2 | DIASTOLIC BLOOD PRESSURE: 72 MMHG | RESPIRATION RATE: 16 BRPM | HEIGHT: 73 IN | HEART RATE: 87 BPM

## 2019-02-28 DIAGNOSIS — J32.9 SINUSITIS, UNSPECIFIED CHRONICITY, UNSPECIFIED LOCATION: ICD-10-CM

## 2019-02-28 DIAGNOSIS — R07.0 THROAT PAIN: Primary | ICD-10-CM

## 2019-02-28 LAB
DEPRECATED S PYO AG THROAT QL EIA: NORMAL
SPECIMEN SOURCE: NORMAL

## 2019-02-28 PROCEDURE — 87081 CULTURE SCREEN ONLY: CPT | Performed by: NURSE PRACTITIONER

## 2019-02-28 PROCEDURE — 87880 STREP A ASSAY W/OPTIC: CPT | Performed by: NURSE PRACTITIONER

## 2019-02-28 PROCEDURE — 99213 OFFICE O/P EST LOW 20 MIN: CPT | Performed by: NURSE PRACTITIONER

## 2019-02-28 RX ORDER — AZITHROMYCIN 250 MG/1
TABLET, FILM COATED ORAL
Qty: 6 TABLET | Refills: 0 | Status: SHIPPED | OUTPATIENT
Start: 2019-02-28 | End: 2020-03-03

## 2019-02-28 ASSESSMENT — MIFFLIN-ST. JEOR: SCORE: 1892.57

## 2019-02-28 NOTE — PATIENT INSTRUCTIONS
Thank you for choosing [facility].  You may be receiving a survey in the mail from Luanne Hwang regarding your visit today.  Please take a few minutes to complete and return the survey to let us know how we are doing.      If you have questions or concerns, please contact us via Electronic Brailler or you can contact your care team at [phone].    Our Clinic hours are:  Monday 6:40 am  to 7:00 pm  Tuesday -Friday 6:40 am to 5:00 pm    The [place] outpatient lab hours are:  Monday - Friday 6:10 am to 4:45 pm  Saturdays 7:00 am to 11:00 am  Appointments are required, call [phone]    If you have clinical questions after hours or would like to schedule an appointment,  call the clinic at [phone].

## 2019-02-28 NOTE — PROGRESS NOTES
SUBJECTIVE:   Edvin Tuttle is a 23 year old male who presents to clinic today for the following health issues:      ENT Symptoms             Symptoms: cc Present Absent Comment   Fever/Chills  x     Fatigue  x     Muscle Aches  x     Eye Irritation   x    Sneezing   x    Nasal Joey/Drg  x     Sinus Pressure/Pain       Loss of smell   x    Dental pain   x    Sore Throat  x     Swollen Glands   x    Ear Pain/Fullness  x     Cough   x    Wheeze   x    Chest Pain   x    Shortness of breath   x    Rash   x    Other   x      Symptom duration:  one week, ST is worse   Symptom severity:  modrate   Treatments tried:  OTC   Contacts:  Students, works for the [employer]     Patient is not a smoker and does not have asthma.   Symptoms started with sore throat, headache and fever- (reported) , body aches.   + productive cough.          -------------------------------------    Problem list and histories reviewed & adjusted, as indicated.  Additional history: as documented    Patient Active Problem List   Diagnosis     Pneumonia     Chronic seasonal allergic rhinitis, unspecified trigger     History reviewed. No pertinent surgical history.    Social History     Tobacco Use     Smoking status: Never Smoker     Smokeless tobacco: Never Used     Tobacco comment: Parents smoke outside.   Substance Use Topics     Alcohol use: Yes     Family History   Problem Relation Age of Onset     Musculoskeletal Disorder Father         ALS     Alzheimer Disease Maternal Grandmother      Cancer Maternal Grandfather         lung     Cancer Paternal Grandfather         Lung     Other - See Comments Mother 65        throat cancer           Reviewed and updated as needed this visit by clinical staff       Reviewed and updated as needed this visit by Provider         ROS:  Constitutional, HEENT, cardiovascular, pulmonary, gi and gu systems are negative, except as otherwise noted.    OBJECTIVE:     /72 (BP Location: Left arm)   Pulse 87   Temp  "97  F (36.1  C) (Tympanic)   Resp 16   Ht 1.854 m (6' 1\")   Wt 84.4 kg (186 lb)   SpO2 99%   BMI 24.54 kg/m    Body mass index is 24.54 kg/m .  GENERAL: healthy, alert and no distress  NECK: no adenopathy, no asymmetry, masses, or scars and thyroid normal to palpation  RESP: lungs clear to auscultation - no rales, rhonchi or wheezes  CV: regular rate and rhythm, normal S1 S2, no S3 or S4, no murmur, click or rub, no peripheral edema and peripheral pulses strong  MS: no gross musculoskeletal defects noted, no edema    Diagnostic Test Results:  none     ASSESSMENT/PLAN:       1. Throat pain  RSS- negative- await culture  - Rapid strep screen  - Beta strep group A culture    2. Sinusitis, unspecified chronicity, unspecified location  - will treat patient based on duration of symptoms   - azithromycin (ZITHROMAX Z-CARLOS) 250 MG tablet; Take 2 tablets on day 1 and then 1 tablet on days 2-5.  Dispense: 6 tablet; Refill: 0        MANDY Kaiser [facility]  "

## 2019-02-28 NOTE — LETTER
March 1, 2019      Edvin Tuttle  [address] [place] [facility] [address]        The results of your recent throat culture were negative.  If you have any further questions or concerns please contact the clinic.            Sincerely,        MANDY Kaiser CNP/dk

## 2019-03-01 LAB
BACTERIA SPEC CULT: NORMAL
SPECIMEN SOURCE: NORMAL

## 2019-06-17 PROBLEM — J30.2 CHRONIC SEASONAL ALLERGIC RHINITIS: Status: ACTIVE | Noted: 2017-10-16

## 2020-03-03 ENCOUNTER — ANCILLARY PROCEDURE (OUTPATIENT)
Dept: GENERAL RADIOLOGY | Facility: CLINIC | Age: 25
End: 2020-03-03
Attending: NURSE PRACTITIONER
Payer: COMMERCIAL

## 2020-03-03 ENCOUNTER — OFFICE VISIT (OUTPATIENT)
Dept: FAMILY MEDICINE | Facility: CLINIC | Age: 25
End: 2020-03-03
Payer: COMMERCIAL

## 2020-03-03 VITALS
BODY MASS INDEX: 22.93 KG/M2 | OXYGEN SATURATION: 99 % | SYSTOLIC BLOOD PRESSURE: 104 MMHG | RESPIRATION RATE: 10 BRPM | HEIGHT: 73 IN | DIASTOLIC BLOOD PRESSURE: 66 MMHG | HEART RATE: 70 BPM | WEIGHT: 173 LBS | TEMPERATURE: 98.5 F

## 2020-03-03 DIAGNOSIS — R06.02 SOB (SHORTNESS OF BREATH): Primary | ICD-10-CM

## 2020-03-03 DIAGNOSIS — R06.02 SOB (SHORTNESS OF BREATH): ICD-10-CM

## 2020-03-03 LAB
FEF 25/75: NORMAL
FEV-1: NORMAL
FEV1/FVC: NORMAL
FVC: NORMAL

## 2020-03-03 PROCEDURE — 71046 X-RAY EXAM CHEST 2 VIEWS: CPT

## 2020-03-03 PROCEDURE — 94010 BREATHING CAPACITY TEST: CPT | Performed by: NURSE PRACTITIONER

## 2020-03-03 PROCEDURE — 99214 OFFICE O/P EST MOD 30 MIN: CPT | Mod: 25 | Performed by: NURSE PRACTITIONER

## 2020-03-03 ASSESSMENT — MIFFLIN-ST. JEOR: SCORE: 1828.6

## 2020-03-03 NOTE — PATIENT INSTRUCTIONS
Thank you for choosing [facility]  You may be receiving an email and/or telephone survey request from [facility] [employer] regarding your visit today.  Please take a few minutes to respond to the survey to let us know how we are doing.      If you have questions or concerns, please contact us via XZERES or you can contact your care team at [phone].    Our Clinic hours are:  Monday 6:40 am  to 7:00 pm  Tuesday -Friday 6:40 am to 5:00 pm    The [place] outpatient lab hours are:  Monday - Friday 6:10 am to 4:45 pm  Saturdays 7:00 am to 11:00 am  Appointments are required, call [phone]    If you have clinical questions after hours or would like to schedule an appointment,  call the clinic at [phone].

## 2020-03-03 NOTE — PROGRESS NOTES
Subjective     Edvin Tuttle is a 24 year old male who presents to clinic today for the following health issues:    HPI   Concern - shortness of breath   Onset: one week or more- no history of asthma.   Has history of seasonal allergies. No recent illness.     Description:   The feeling of not getting enough air- symptoms started with yawning- feels like he cannot take a real deep breath. When he takes a deep breath- feels tightness in his chest and pain in his sternum. Feels like it causes anxiety.   No history of smoking/vaping.  No history of PE/DVT.   Activity does not seem change breathing.     Intensity: moderate    Progression of Symptoms:  worsening    Accompanying Signs & Symptoms:  Can breath better when twisting his upper body a certain way     Previous history of similar problem:   none    Precipitating factors:   Worsened by: nonthing    Alleviating factors:  Improved by: re positioning his upper body    Therapies Tried and outcome: none    -------------------------------------    Patient Active Problem List   Diagnosis     Pneumonia     Chronic seasonal allergic rhinitis, unspecified trigger     No past surgical history on file.    Social History     Tobacco Use     Smoking status: Never Smoker     Smokeless tobacco: Never Used     Tobacco comment: Parents smoke outside.   Substance Use Topics     Alcohol use: Yes     Family History   Problem Relation Age of Onset     Musculoskeletal Disorder Father         ALS     Alzheimer Disease Maternal Grandmother      Cancer Maternal Grandfather         lung     Cancer Paternal Grandfather         Lung     Other - See Comments Mother 65        throat cancer           -------------------------------------  Reviewed and updated as needed this visit by Provider         Review of Systems   ROS COMP: Constitutional, HEENT, cardiovascular, pulmonary, GI, , musculoskeletal, neuro, skin, endocrine and psych systems are negative, except as otherwise noted.       Objective    There were no vitals taken for this visit.  There is no height or weight on file to calculate BMI.  Physical Exam   GENERAL: healthy, alert and no distress  NECK: no adenopathy, no asymmetry, masses, or scars and thyroid normal to palpation  RESP: lungs clear to auscultation - no rales, rhonchi or wheezes  CV: regular rate and rhythm, normal S1 S2, no S3 or S4, no murmur, click or rub, no peripheral edema and peripheral pulses strong  MS: no gross musculoskeletal defects noted, no edema    Diagnostic Test Results:  Labs reviewed in Epic  Results for orders placed or performed in visit on 03/03/20 (from the past 24 hour(s))   Spirometry, Breathing Capacity: Normal Order, Clinic Performed   Result Value Ref Range    FEV-1      FVC      FEV1/FVC      FEF 25/75             Assessment & Plan     1. SOB (shortness of breath)  New onset of symptoms X 1 week - no history of tobacco use or PE/DVT.   Spirometry testing completed today-   - XR Chest 2 Views; Future  - Spirometry, Breathing Capacity: Normal Order, Clinic Performed  - General PFT Lab (Please always keep checked); Future  - Pulmonary Function Test; Future         No follow-ups on file.     > 25 min spent in direct face to face time with this patient, greater than 50% in counseling and coordination of care.      MANDY [facility]

## 2020-04-02 ENCOUNTER — VIRTUAL VISIT (OUTPATIENT)
Dept: FAMILY MEDICINE | Facility: CLINIC | Age: 25
End: 2020-04-02
Payer: COMMERCIAL

## 2020-04-02 DIAGNOSIS — J45.20 MILD INTERMITTENT ASTHMA WITHOUT COMPLICATION: Primary | ICD-10-CM

## 2020-04-02 PROCEDURE — 99442 ZZC PHYSICIAN TELEPHONE EVALUATION 11-20 MIN: CPT | Performed by: FAMILY MEDICINE

## 2020-04-02 RX ORDER — LORAZEPAM 0.5 MG/1
0.5 TABLET ORAL EVERY 6 HOURS PRN
COMMUNITY
End: 2020-11-23

## 2020-04-02 RX ORDER — ALBUTEROL SULFATE 90 UG/1
2 AEROSOL, METERED RESPIRATORY (INHALATION) EVERY 4 HOURS PRN
Qty: 1 INHALER | Refills: 11 | Status: SHIPPED | OUTPATIENT
Start: 2020-04-02 | End: 2020-06-26

## 2020-04-02 NOTE — PROGRESS NOTES
"Subjective     Edvin Tuttle is a 24 year old male who is being evaluated via a billable telephone visit.      The patient has been notified of following:     \"This telephone visit will be conducted via a call between you and your physician/provider. We have found that certain health care needs can be provided without the need for a physical exam.  This service lets us provide the care you need with a short phone conversation.  If a prescription is necessary we can send it directly to your pharmacy.  If lab work is needed we can place an order for that and you can then stop by our lab to have the test done at a later time.    If during the course of the call the physician/provider feels a telephone visit is not appropriate, you will not be charged for this service.\"     Patient has given verbal consent for Telephone visit?  Yes    Edvin Tuttle complains of   Chief Complaint   Patient presents with     Breathing Problem       ALLERGIES  Patient has no known allergies.    RESPIRATORY SYMPTOMS      Duration: x 3 month. Patient states he does not think  It is from anxiety but he does get sob and starts to hyperventilate.     Description  wheezing and Shortness of breath. Patient states he does not cough in less he takes a very big deep breath then sometimes he will cough. Patient is thinking its asthma.    Severity: mild to moderate     Accompanying signs and symptoms: Patient states he was sick in January. His roommate had influenza B but he never went into the clinic and got tested.  History (predisposing factors):  Patient does have seasonal allergies. He states he has been prescribed an albuterol inhaler for allergies in the past.   Patient Active Problem List   Diagnosis     Pneumonia     Chronic seasonal allergic rhinitis, unspecified trigger         Precipitating or alleviating factors: None    Therapies tried and outcome:  Lorazepam and a broncho dilator tablet- generic       Current Outpatient " Medications:      LORazepam (ATIVAN) 0.5 MG tablet, Take 0.5 mg by mouth every 6 hours as needed for anxiety, Disp: , Rfl:      NO ACTIVE MEDICATIONS, , Disp: , Rfl:      (J45.20) Mild intermittent asthma without complication  (primary encounter diagnosis)  Comment: Plan: albuterol (PROAIR HFA/PROVENTIL HFA/VENTOLIN         HFA) 108 (90 Base) MCG/ACT inhaler        We discussed the likely cause is asthma. The trigger appears to be allergies.  I taught the use of the albuterol inhaler. Use as needed, every 3-4 hours. Use good hydration and hygiene.   Get the flu shot every fall. Get a home peak flow meter. Use the OTC allergy meds such as Claritin.   If not better then recheck in clinic for a face to face visit.     Zach Robison MD            Phone call duration:  16 minutes

## 2020-04-02 NOTE — PATIENT INSTRUCTIONS
(J45.20) Mild intermittent asthma without complication  (primary encounter diagnosis)  Comment: Plan: albuterol (PROAIR HFA/PROVENTIL HFA/VENTOLIN         HFA) 108 (90 Base) MCG/ACT inhaler        We discussed the likely cause is asthma. The trigger appears to be allergies.  I taught the use of the albuterol inhaler. Use as needed, every 3-4 hours. Use good hydration and hygiene.   Get the flu shot every fall. Get a home peak flow meter. Use the OTC allergy meds such as Claritin.   If not better then recheck in clinic for a face to face visit.

## 2020-04-06 ENCOUNTER — NURSE TRIAGE (OUTPATIENT)
Dept: FAMILY MEDICINE | Facility: CLINIC | Age: 25
End: 2020-04-06

## 2020-04-06 NOTE — TELEPHONE ENCOUNTER
Reason for Call:  Other prescription    Detailed comments: Pt is calling on the following medication:    Outpatient Medication Detail      Disp  Refills  Start  End  ELLIOTT    albuterol (PROAIR HFA/PROVENTIL HFA/VENTOLIN HFA) 108 (90 Base) MCG/ACT inhaler  1 Inhaler  11  4/2/2020   --    Sig - Route: Inhale 2 puffs into the lungs every 4 hours as needed - Inhalation    Sent to pharmacy as: albuterol (PROAIR HFA/PROVENTIL HFA/VENTOLIN HFA) 108 (90 Base) MCG/ACT inhaler    Class: E-Prescribe    Notes to Pharmacy: Pharmacy may dispense brand covered by insurance (Proair, or proventil or ventolin or generic albuterol inhaler)    Order: [id number]    E-Prescribing Status: Receipt confirmed by pharmacy (4/2/2020  1:15 PM CDT)      Stating he doesn't feel like its working for him  Please advise  Phone Number Patient can be reached at: Home number on file [phone] (home)    Best Time: any    Can we leave a detailed message on this number? YES    Call taken on 4/6/2020 at 11:04 AM by Shanna Beavers

## 2020-04-06 NOTE — TELEPHONE ENCOUNTER
"S-(situation): Pt not getting complete relief from using inhaler.    B-(background): Virtual Visit with Dr. Robison 4/2/2020 for breathing problem.     A-(assessment):   Says the inhaler \"feels like a bandaid on a bigger problem\" and that he feels like he has to \"manually breathe\" and his \"natural breathing is shallow and not fully effective.\"  For the last couple days he is using the albuterol inhaler every 4-5 hours.  He has a peak flow meter for his lungs, he did not seen an improvement after using the inhaler.  He also mentions he has heartburn and is worried it is \"like a hernia or something.\" Discussed staying up at least 2 hours before lying down, which he was cognizant of last night yet he still had symptoms 5 hours after eating.   He says really anything is causing indigestion. He is avoiding caffeine.   He says he has not been drinking alcohol as he has not felt good. Reports he has never smoked.  He does NOT have a spacer for his inhaler.   Reviewed proper in haler use; confirmed he is holding in for 10 sec, then repeating a minute later.   He does not notice a difference in his breathing from going outside and inside; using Zyrtec.   He is not using any lorazepam.   The main concern he said he has is a \"lung infection or hernia.\"     R-(recommendations): Routing to Dr. Robison to review and advise. Discussed with pt the Virtual Visit note reads: 'If not better then recheck in clinic for a face to face visit.' Pt says he would like to but also \"I'm hesitant to do that.\"     Amanda ASHBY, RN, BSN        "

## 2020-04-08 ENCOUNTER — OFFICE VISIT (OUTPATIENT)
Dept: FAMILY MEDICINE | Facility: CLINIC | Age: 25
End: 2020-04-08
Payer: COMMERCIAL

## 2020-04-08 VITALS
OXYGEN SATURATION: 96 % | WEIGHT: 170.5 LBS | BODY MASS INDEX: 22.6 KG/M2 | DIASTOLIC BLOOD PRESSURE: 73 MMHG | HEIGHT: 73 IN | RESPIRATION RATE: 16 BRPM | HEART RATE: 82 BPM | SYSTOLIC BLOOD PRESSURE: 125 MMHG | TEMPERATURE: 97.8 F

## 2020-04-08 DIAGNOSIS — J45.30 MILD PERSISTENT ASTHMA WITHOUT COMPLICATION: Primary | ICD-10-CM

## 2020-04-08 PROCEDURE — 99213 OFFICE O/P EST LOW 20 MIN: CPT | Performed by: FAMILY MEDICINE

## 2020-04-08 RX ORDER — PREDNISONE 20 MG/1
20 TABLET ORAL DAILY
Qty: 14 TABLET | Refills: 0 | Status: SHIPPED | OUTPATIENT
Start: 2020-04-08 | End: 2020-06-10

## 2020-04-08 ASSESSMENT — PAIN SCALES - GENERAL: PAINLEVEL: NO PAIN (0)

## 2020-04-08 ASSESSMENT — MIFFLIN-ST. JEOR: SCORE: 1817.26

## 2020-04-08 NOTE — TELEPHONE ENCOUNTER
My recommendation is that Josh be seen in clinic for a face to face visit. Virtual visits are limited in the ability to diagnose the issues.     Zach Robison MD

## 2020-04-08 NOTE — PATIENT INSTRUCTIONS
*  Sounds like asthma.     *   Try a course of prednisone pills for 14 days. Can make stomach upset worse.     *   If it helps, we can try a twice daily inhaler different than the albuterol.     *    Keep us updated via phone or [facility].

## 2020-04-08 NOTE — PROGRESS NOTES
"Subjective     Edvin Tuttle is a 24 year old male who presents to clinic today for the following health issues:    HPI     -See virtual visit from 4/2/2020.    SOB that has been ongoing since January, this has worsened over the past 2 weeks. He notes that he feels he can not get enough air though his nose when breathing. He does sometimes have a dry cough if taking a deep breath. No wheezing. No other cold sx. He does have dx of mild intermittent asthma and is using a albuterol inhaler prn. He notes that medication does help somewhat for SOB but does cause a racing heart beat. He notes that this seems to worsen when laying down at night or when he is worried. He denies any new stressors. He notes that he has not taken Ativan in about 4-5 days.     Review chart, chest x-ray is negative, symptoms temporarily improved with use of an albuterol inhaler.  He notes significant nighttime air hunger and slight decrease in exercise endurance.      Reviewed and updated as needed this visit by Provider    Past medical history: Seasonal allergies, typically spring.    Family history: Uncle with a history of seasonal allergies, no immediate family members with a history of asthma.    Habits: Non-smoker.  Review of Systems   ROS COMP: Constitutional, HEENT, cardiovascular, pulmonary, gi and gu systems are negative, except as otherwise noted.      Objective    /73   Pulse 82   Temp 97.8  F (36.6  C) (Tympanic)   Resp 16   Ht 1.854 m (6' 1\")   Wt 77.3 kg (170 lb 8 oz)   SpO2 96%   BMI 22.49 kg/m    Body mass index is 22.49 kg/m .  Physical Exam   GENERAL: Healthy, alert and no distress  EYES: Eyes grossly normal to inspection, conjunctivae and sclerae normal  RESP: Lungs clear to auscultation - no rales, rhonchi or wheezes  CV: Regular rate and rhythm, normal S1 S2, no murmur  MS: No gross musculoskeletal defects noted, no edema  NEURO: Normal strength and tone, mentation intact and speech normal  PSYCH: Mentation " appears normal, affect normal/bright    Diagnostic Test Results:  Labs reviewed in Epic  none         Assessment & Plan     (J45.30) Mild persistent asthma without complication  (primary encounter diagnosis)  Comment: Persistent symptoms, duration 3 months.  Doubt infectious etiology, consider reflux.  Short-term improvement with bronchodilation, consider new diagnosis of asthma.  Reviewed options, will try a short course of low-dose oral steroids to see if he responds clinically.  If he does, he be a candidate for long-term inhaled corticosteroid therapy such as Flovent.  Clinically stable, advised that he keep us updated if his symptoms worsen, improved, or there is no improvement.  Reviewed side effects of the medication.  Plan: predniSONE (DELTASONE) 20 MG tablet    Patient Instructions   *  Sounds like asthma.     *   Try a course of prednisone pills for 14 days. Can make stomach upset worse.     *   If it helps, we can try a twice daily inhaler different than the albuterol.     *    Keep us updated via phone or [facility].      Return in about 2 weeks (around 4/22/2020), or if symptoms worsen or fail to improve.    Raven Santana MD  [facility]

## 2020-04-08 NOTE — TELEPHONE ENCOUNTER
Reason for Disposition    [1] Travel from city or country with major community spread within last 14 days AND [2] NO cough or fever or breathing difficulty    [1] COVID-19 EXPOSURE (Close Contact) within last 14 days AND [2] NO cough, fever, or breathing difficulty    [1] Living in area with major community spread within last 14 days AND [2] NO cough or fever or breathing difficulty    Additional Information    Negative: SEVERE difficulty breathing (e.g., struggling for each breath, speak in single words, bluish lips)    Negative: Sounds like a life-threatening emergency to the triager    Negative: Patient sounds very sick or weak to the triager    Negative: [1] Difficulty breathing occurs AND [2] within 14 days of COVID-19 EXPOSURE (Close Contact)    Negative: [1] Fever (or feeling feverish) OR cough AND [2] within 14 Days of COVID-19 EXPOSURE (Close Contact)    Negative: [1] Fever (or feeling feverish) OR cough occurs AND [2] within 14 days of travel from another country (international travel)    Negative: [1] Fever (or feeling feverish) OR cough occurs AND [2] within 14 days of travel from a city or area with major community spread    Negative: [1] Fever (or feeling feverish) OR cough occurs AND [2] living in area with major community spread AND [3] testing being done for symptoms    Negative: [1] Mild body aches, chills, diarrhea, headache, runny nose, or sore throat AND [2] within 14 days of COVID-Exposure    Negative: [1] COVID-19 EXPOSURE within last 14 days AND [2] NO cough, fever, or breathing difficulty AND [3] exposed person is a healthcare worker who was NOT using all recommended personal protective equipment (i.e., a respirator-N95 mask, eye protection, gloves, and gown)    Negative: [1] COVID-19 EXPOSURE AND [2] 15 or more days ago AND [3] NO cough or fever or breathing difficulty    Negative: [1] No COVID-19 EXPOSURE BUT [2] living with someone who was exposed and who has no fever or cough     "Negative: [1] Caller concerned that exposure to COVID-19 occurred BUT [2] does not meet COVID-19 EXPOSURE criteria from CDC    Negative: COVID -19, questions about    Negative: COVID-19 Prevention and Healthy Living, questions about    Negative: COVID-19 Testing, questions about    Answer Assessment - Initial Assessment Questions  1. CLOSE CONTACT: \"Who is the person with the confirmed or suspected COVID-19 infection that you were exposed to?\"      na  2. PLACE of CONTACT: \"Where were you when you were exposed to COVID-19?\" (e.g., home, school, medical waiting room; which city?)      na  3. TYPE of CONTACT: \"How much contact was there?\" (e.g., sitting next to, live in same house, work in same office, same building)      na  4. DURATION of CONTACT: \"How long were you in contact with the COVID-19 patient?\" (e.g., a few seconds, passed by person, a few minutes, live with the patient)      na  5. DATE of CONTACT: \"When did you have contact with a COVID-19 patient?\" (e.g., how many days ago)      na  6. TRAVEL: \"Have you traveled out of the country recently?\" If so, \"When and where?\"      * Also ask about out-of-state travel, since the [facility] has identified some high risk cities for community spread in the .      * Note: Travel becomes less relevant if there is widespread community transmission where the patient lives.      na  7. COMMUNITY SPREAD: \"Are there lots of cases or COVID-19 (community spread) where you live?\" (See public health department website, if unsure)    * MAJOR community spread: high number of cases; numbers of cases are increasing; many people hospitalized.    * MINOR community spread: low number of cases; not increasing; few or no people hospitalized      na  8. SYMPTOMS: \"Do you have any symptoms?\" (e.g., fever, cough, breathing difficulty)      Sob since Jan.   9. PREGNANCY OR POSTPARTUM: \"Is there any chance you are pregnant?\" \"When was your last menstrual period?\" \"Did you deliver in the last 2 " "weeks?\"      no  10. HIGH RISK: \"Do you have any heart or lung problems? Do you have a weak immune system?\" (e.g., CHF, COPD, asthma, HIV positive, chemotherapy, renal failure, diabetes mellitus, sickle cell anemia)    allergies    Protocols used: CORONAVIRUS (COVID-19) EXPOSURE-A- 3.30.20    "

## 2020-06-08 ENCOUNTER — TELEPHONE (OUTPATIENT)
Dept: FAMILY MEDICINE | Facility: CLINIC | Age: 25
End: 2020-06-08

## 2020-06-08 NOTE — TELEPHONE ENCOUNTER
Reason for Call:  Medication or medication refill:    Do you use a [facility]?  Name of the pharmacy and phone number for the current request:  [facility] [phone]    Name of the medication requested:   *   If it helps, we can try a twice daily inhaler different than the albuterol. From visit 4/8/2020    Can we leave a detailed message on this number? YES    Phone number patient can be reached at: Home number on file [phone] (home)    Best Time: any    Call taken on 6/8/2020 at 11:17 AM by Nova Maddox

## 2020-06-08 NOTE — TELEPHONE ENCOUNTER
S-(situation): alternative requested as noted by Dr. Santana    B-(background): 04/08/20 Max  (J45.30) Mild persistent asthma without complication  (primary encounter diagnosis)  Comment: Persistent symptoms, duration 3 months.  Doubt infectious etiology, consider reflux.  Short-term improvement with bronchodilation, consider new diagnosis of asthma.  Reviewed options, will try a short course of low-dose oral steroids to see if he responds clinically.  If he does, he be a candidate for long-term inhaled corticosteroid therapy such as Flovent.  Clinically stable, advised that he keep us updated if his symptoms worsen, improved, or there is no improvement.  Reviewed side effects of the medication.  Plan: predniSONE (DELTASONE) 20 MG tablet     Patient Instructions   *  Sounds like asthma.      *   Try a course of prednisone pills for 14 days. Can make stomach upset worse.      *   If it helps, we can try a twice daily inhaler different than the albuterol.      *    Keep us updated via phone or MyChart.      A-(assessment): Patient would like the twice daily inhaler    R-(recommendations): Routing to CYNTHIA StantonN, RN

## 2020-06-09 NOTE — TELEPHONE ENCOUNTER
Looks like this was instituted 2 months ago.  It would be helpful to touch base with pt in follow up for effect of med and current symptoms prior to prescribing.  I would recommend pt schedule a visit.  If he would prefer to wait for Dr Santana to return and address this himself that would be fine as well.    Jessica Wilkes, DO

## 2020-06-09 NOTE — TELEPHONE ENCOUNTER
Call placed to patient.  Relayed message per Dr Wilkes.  Scheduled telephone visit for tomorrow per patient request.  Parul Draper RN

## 2020-06-10 ENCOUNTER — VIRTUAL VISIT (OUTPATIENT)
Dept: FAMILY MEDICINE | Facility: CLINIC | Age: 25
End: 2020-06-10
Payer: COMMERCIAL

## 2020-06-10 DIAGNOSIS — R06.2 WHEEZING: Primary | ICD-10-CM

## 2020-06-10 PROCEDURE — 99214 OFFICE O/P EST MOD 30 MIN: CPT | Mod: 95 | Performed by: FAMILY MEDICINE

## 2020-06-10 ASSESSMENT — ENCOUNTER SYMPTOMS
FEVER: 0
CHILLS: 0
ARTHRALGIAS: 0
PARESTHESIAS: 0
MYALGIAS: 0
SORE THROAT: 0
HEADACHES: 0
PALPITATIONS: 0
WEAKNESS: 0
NERVOUS/ANXIOUS: 0
COUGH: 1
WHEEZING: 1
SHORTNESS OF BREATH: 1
JOINT SWELLING: 0
DIZZINESS: 0

## 2020-06-10 ASSESSMENT — PAIN SCALES - GENERAL: PAINLEVEL: NO PAIN (0)

## 2020-06-10 NOTE — PROGRESS NOTES
"Edvin Tuttle is a 25 year old male who is being evaluated via a billable telephone visit.      The patient has been notified of following:     \"This telephone visit will be conducted via a call between you and your physician/provider. We have found that certain health care needs can be provided without the need for a physical exam.  This service lets us provide the care you need with a short phone conversation.  If a prescription is necessary we can send it directly to your pharmacy.  If lab work is needed we can place an order for that and you can then stop by our lab to have the test done at a later time.    Telephone visits are billed at different rates depending on your insurance coverage. During this emergency period, for some insurers they may be billed the same as an in-person visit.  Please reach out to your insurance provider with any questions.    If during the course of the call the physician/provider feels a telephone visit is not appropriate, you will not be charged for this service.\"    Patient has given verbal consent for Telephone visit?  Yes    What phone number would you like to be contacted at? [phone]    How would you like to obtain your AVS? Dorene Donovan     Edvin Tuttle is a 25 year old male who presents via phone visit today for the following health issues:    HPI  Asthma Follow-Up    Was ACT completed today?  No      Do you have a cough?  No, sometimes when he takes in a deep breath it will force a cough    Are you experiencing any wheezing in your chest?  No    Do you have any shortness of breath?  YES     How often are you using a short-acting (rescue) inhaler or nebulizer, such as Albuterol?  Never    How many days per week do you miss taking your asthma controller medication?  I do not have an asthma controller medication    Please describe any recent triggers for your asthma: Patient is unaware of triggers    Have you had any Emergency Room Visits, Urgent Care " Visits, or Hospital Admissions since your last office visit?  No      How many servings of fruits and vegetables do you eat daily?  2-3    On average, how many sweetened beverages do you drink each day (Examples: soda, juice, sweet tea, etc.  Do NOT count diet or artificially sweetened beverages)?   1    How many days per week do you exercise enough to make your heart beat faster? 3 or less    How many minutes a day do you exercise enough to make your heart beat faster? 20 - 29    How many days per week do you miss taking your medication? He is not on any medication.    Patient Active Problem List   Diagnosis     Pneumonia     Chronic seasonal allergic rhinitis, unspecified trigger     History reviewed. No pertinent surgical history.    Social History     Tobacco Use     Smoking status: Never Smoker     Smokeless tobacco: Never Used     Tobacco comment: Parents smoke outside.   Substance Use Topics     Alcohol use: Yes     Family History   Problem Relation Age of Onset     Musculoskeletal Disorder Father         ALS     Alzheimer Disease Maternal Grandmother      Cancer Maternal Grandfather         lung     Cancer Paternal Grandfather         Lung     Other - See Comments Mother 65        throat cancer         Current Outpatient Medications   Medication Sig Dispense Refill     albuterol (PROAIR HFA/PROVENTIL HFA/VENTOLIN HFA) 108 (90 Base) MCG/ACT inhaler Inhale 2 puffs into the lungs every 4 hours as needed (Patient not taking: Reported on 6/10/2020) 1 Inhaler 11     LORazepam (ATIVAN) 0.5 MG tablet Take 0.5 mg by mouth every 6 hours as needed for anxiety       No Known Allergies    Reviewed and updated as needed this visit by Provider       1. Asthma concerns: Patient had the flu in January.  Was treated with albuterol with minimal improvement.  Treated with prednisone with good improvement.  States of tightness in chest.  Can not get a deep breath.  Feels like breathing is not at capacity.  States of mild  expiratory wheezing.  Intermittent dry cough.  No direct family history of asthma.  No direct triggers.  History of eczema he thinks.     Review of Systems   Constitutional: Negative for chills and fever.   HENT: Negative for congestion, ear pain, hearing loss and sore throat.    Respiratory: Positive for cough, shortness of breath and wheezing.    Cardiovascular: Negative for chest pain, palpitations and peripheral edema.   Musculoskeletal: Negative for arthralgias, joint swelling and myalgias.   Skin: Negative for rash.   Neurological: Negative for dizziness, weakness, headaches and paresthesias.   Psychiatric/Behavioral: Negative for mood changes. The patient is not nervous/anxious.         Objective   Reported vitals:  There were no vitals taken for this visit.   healthy, alert and no distress  PSYCH: Alert and oriented times 3; coherent speech, normal   rate and volume, able to articulate logical thoughts, able   to abstract reason, no tangential thoughts, no hallucinations   or delusions  His affect is normal  RESP: No cough, no audible wheezing, able to talk in full sentences  Remainder of exam unable to be completed due to telephone visits      Assessment/Plan:    1. Wheezing  Would like to evaluate for asthma.  Patient would long term ICS and short acting beta agonist.  Advised to go directly to ER if symptoms get worse.   - General PFT Lab (Please always keep checked); Future  - Pulmonary Function Test; Future    No follow-ups on file.    Phone call duration:  10 minutes    Ramiro Koroma DO

## 2020-06-13 ENCOUNTER — NURSE TRIAGE (OUTPATIENT)
Dept: NURSING | Facility: CLINIC | Age: 25
End: 2020-06-13

## 2020-06-13 NOTE — TELEPHONE ENCOUNTER
"Patient calling. States he has had breathing issues since he had the flu last winter. Was seen by Dr. Santana in [place] and treated with steroids. States he had relief from primary symptoms but is still having \"breathing issues.\"    He says the doctor told him that the most likely cause of the issues is asthma and that if he continues to have issues after completing the steroids, he should use an inhaler twice daily.     He is now having issues and scheduled a virtual visit last week but the consulting physician did not want to order an inhaler. He suggested asthma testing be completed first. Patient called to schedule testing but [place] in not currently doing asthma testing.    Patient continues to struggle with labored breathing. No other symptoms at this time.    Transferred to scheduling for an in clinic visit.    Protocol and care advice reviewed  Caller states understanding of the recommended disposition  Advised to call back if further questions or concerns      Reason for Disposition    [1] MILD longstanding difficulty breathing AND [2]  SAME as normal    Additional Information    Negative: [1] Breathing stopped AND [2] hasn't returned    Negative: Choking on something    Negative: Severe difficulty breathing (e.g., struggling for each breath, speaks in single words)    Negative: Bluish (or gray) lips or face now    Negative: Difficult to awaken or acting confused (e.g., disoriented, slurred speech)    Negative: Passed out (i.e., lost consciousness, collapsed and was not responding)    Negative: Wheezing started suddenly after medicine, an allergic food or bee sting    Negative: Stridor    Negative: Slow, shallow and weak breathing    Negative: Sounds like a life-threatening emergency to the triager    Negative: [1] MODERATE difficulty breathing (e.g., speaks in phrases, SOB even at rest, pulse 100-120) AND [2] NEW-onset or WORSE than normal    Negative: Wheezing can be heard across the room    Negative: " "Drooling or spitting out saliva (because can't swallow)    Negative: History of prior \"blood clot\" in leg or lungs (i.e., deep vein thrombosis, pulmonary embolism)    Negative: History of inherited increased risk of blood clots (e.g., Factor 5 Leiden, Anti-thrombin 3, Protein C or Protein S deficiency, Prothrombin mutation)    Negative: Recent illness requiring prolonged bedrest (i.e., immobilization)    Negative: Hip or leg fracture in past 2 months (e.g., had cast on leg or ankle)    Negative: Major surgery in the past month    Negative: Recent long-distance travel with prolonged time in car, bus, plane, or train (i.e., within past 2 weeks; 6 or  more hours duration)    Negative: Extra heart beats OR irregular heart beating   (i.e., \"palpitations\")    Negative: Fever > 103 F (39.4 C)    Negative: [1] Fever > 101 F (38.3 C) AND [2] age > 60    Negative: [1] Fever > 100.0 F (37.8 C) AND [2] bedridden (e.g., nursing home patient, CVA, chronic illness, recovering from surgery)    Negative: [1] Fever > 100.0 F (37.8 C) AND [2] diabetes mellitus or weak immune system (e.g., HIV positive, cancer chemo, splenectomy, organ transplant, chronic steroids)    Negative: [1] Periods where breathing stops and then resumes normally AND [2] bedridden (e.g., nursing home patient, CVA)    Negative: Pregnant or postpartum (< 1 month since delivery)    Negative: Patient sounds very sick or weak to the triager    Negative: [1] MILD difficulty breathing (e.g., minimal/no SOB at rest, SOB with walking, pulse <100) AND [2] NEW-onset or WORSE than normal    Negative: [1] Longstanding difficulty breathing (e.g., CHF, COPD, emphysema) AND [2] WORSE than normal    Negative: [1] Longstanding difficulty breathing AND [2] not responding to usual therapy    Negative: [1] Continuous (nonstop) coughing AND [2] keeps from working or sleeping    Negative: [1] MODERATE longstanding difficulty breathing (e.g., speaks in phrases, SOB even at rest, pulse " 100-120) AND [2] SAME as normal    Protocols used: BREATHING DIFFICULTY-A-AH

## 2020-06-15 ENCOUNTER — OFFICE VISIT (OUTPATIENT)
Dept: FAMILY MEDICINE | Facility: CLINIC | Age: 25
End: 2020-06-15
Payer: COMMERCIAL

## 2020-06-15 VITALS
OXYGEN SATURATION: 99 % | WEIGHT: 177 LBS | HEIGHT: 73 IN | BODY MASS INDEX: 23.46 KG/M2 | SYSTOLIC BLOOD PRESSURE: 124 MMHG | TEMPERATURE: 97.4 F | RESPIRATION RATE: 12 BRPM | HEART RATE: 78 BPM | DIASTOLIC BLOOD PRESSURE: 76 MMHG

## 2020-06-15 DIAGNOSIS — J45.40 MODERATE PERSISTENT ASTHMA WITHOUT COMPLICATION: Primary | ICD-10-CM

## 2020-06-15 PROCEDURE — 99214 OFFICE O/P EST MOD 30 MIN: CPT | Performed by: FAMILY MEDICINE

## 2020-06-15 RX ORDER — FLUTICASONE PROPIONATE 110 UG/1
1 AEROSOL, METERED RESPIRATORY (INHALATION) 2 TIMES DAILY
Qty: 1 INHALER | Refills: 11 | Status: SHIPPED | OUTPATIENT
Start: 2020-06-15 | End: 2020-06-26

## 2020-06-15 RX ORDER — MONTELUKAST SODIUM 10 MG/1
10 TABLET ORAL AT BEDTIME
Qty: 30 TABLET | Refills: 11 | Status: SHIPPED | OUTPATIENT
Start: 2020-06-15 | End: 2020-09-09

## 2020-06-15 ASSESSMENT — MIFFLIN-ST. JEOR: SCORE: 1841.75

## 2020-06-15 NOTE — PROGRESS NOTES
"Subjective     Edvin Tuttle is a 25 year old male who presents to clinic today for the following health issues:    HPI   BREATHING-ASTHMA CONCERNS:  Patient is here to discuss about his breathing concerns.  He had a Virtual Visit on 6- for the wheezing.  He was not able to schedule for asthma testing as they are not doing that testing currently.  He was prescribed Albuterol-Proair inhaler on 4-2-2020.  He used that at first when prescribed.  He feels it just makes him jittery and is not helping the current symptoms he is having.  He did try it again two days ago and it helped a little bit and he felt jittery again.    Family history of ALS for his father, throat cancer for his mother.    He called and talked the RN Triage on 6-.  That phone note regarding his symptoms is copied below:    Patient calling. States he has had breathing issues since he had the flu last winter. Was seen by Dr. Santana in [place] and treated with steroids. States he had relief from primary symptoms but is still having \"breathing issues.\"     He says the doctor told him that the most likely cause of the issues is asthma and that if he continues to have issues after completing the steroids, he should use an inhaler twice daily.      He is now having issues and scheduled a virtual visit last week but the consulting physician did not want to order an inhaler. He suggested asthma testing be completed first. Patient called to schedule testing but [place] in not currently doing asthma testing.     Patient continues to struggle with labored breathing. No other symptoms at this time.     Transferred to scheduling for an in clinic visit.    Current Outpatient Medications   Medication Instructions     albuterol (PROAIR HFA/PROVENTIL HFA/VENTOLIN HFA) 108 (90 Base) MCG/ACT inhaler 2 puffs, Inhalation, EVERY 4 HOURS PRN     fluticasone (FLOVENT HFA) 110 MCG/ACT inhaler 1 puff, Inhalation, 2 TIMES DAILY     LORazepam (ATIVAN) 0.5 " "mg, Oral, EVERY 6 HOURS PRN     montelukast (SINGULAIR) 10 mg, Oral, AT BEDTIME       Patient Active Problem List   Diagnosis     Pneumonia     Chronic seasonal allergic rhinitis, unspecified trigger     Blood pressure 124/76, pulse 78, temperature 97.4  F (36.3  C), temperature source Tympanic, resp. rate 12, height 1.854 m (6' 1\"), weight 80.3 kg (177 lb), SpO2 99 %.    Exam:  GENERAL APPEARANCE: healthy, alert and no distress  EYES: EOMI,  PERRL  NECK: no adenopathy, no asymmetry, masses, or scars and thyroid normal to palpation  RESP: lungs clear to auscultation - no rales, rhonchi or wheezes  CV: regular rates and rhythm, normal S1 S2, no S3 or S4 and no murmur, click or rub -  MS: extremities normal- no gross deformities noted, no evidence of inflammation in joints, FROM in all extremities.  SKIN: no suspicious lesions or rashes  PSYCH: mentation appears normal and affect normal/bright        (J45.40) Moderate persistent asthma without complication  (primary encounter diagnosis)  Comment:   Plan: fluticasone (FLOVENT HFA) 110 MCG/ACT inhaler,         montelukast (SINGULAIR) 10 MG tablet, General         PFT Lab (Please always keep checked), Pulmonary        Function Test, ALLERGY/ASTHMA ADULT REFERRAL        We reviewed and discussed the issues and the symptoms and will add a steroid inhaler, Flovent at 1 puff twice daily after the albuterol.   Rinse your mouth after the Flovent. If you are better but not normal in 1-2 weeks, then consider starting Singulair at 10 mg daily. Call [phone]   To schedule the lung function tests. Call [phone] for the allergy referral after the lung tests. If these are not making you better, then a referral to   Pulmonary Medicine will be done. Just call our clinic RN at [phone] for this.     Zach Robison MD      "

## 2020-06-15 NOTE — PATIENT INSTRUCTIONS
Thank you for choosing [facility].  You may be receiving an email and/or telephone survey request from [facility] [employer] regarding your visit today.  Please take a few minutes to respond to the survey to let us know how we are doing.      If you have questions or concerns, please contact us via [facility] or you can contact your care team at [phone].    Our Clinic hours are:  Monday 6:40 am  to 7:00 pm  Tuesday -Friday 6:40 am to 5:00 pm    The [place] outpatient lab hours are:  Monday - Friday 6:10 am to 4:45 pm  Saturdays 7:00 am to 11:00 am  Appointments are required, call [phone]    If you have clinical questions after hours or would like to schedule an appointment,  call the clinic at [phone].    (J45.40) Moderate persistent asthma without complication  (primary encounter diagnosis)  Comment:   Plan: fluticasone (FLOVENT HFA) 110 MCG/ACT inhaler,         montelukast (SINGULAIR) 10 MG tablet, General         PFT Lab (Please always keep checked), Pulmonary        Function Test, ALLERGY/ASTHMA ADULT REFERRAL        We reviewed and discussed the issues and the symptoms and will add a steroid inhaler, Flovent at 1 puff twice daily after the albuterol.   Rinse your mouth after the Flovent. If you are better but not normal in 1-2 weeks, then consider starting Singulair at 10 mg daily. Call [phone]   To schedule the lung function tests. Call [phone] for the allergy referral after the lung tests. If these are not making you better, then a referral to   Pulmonary Medicine will be done. Just call our clinic RN at [phone] for this.

## 2020-06-16 ASSESSMENT — ASTHMA QUESTIONNAIRES: ACT_TOTALSCORE: 10

## 2020-06-24 PROBLEM — J45.40 MODERATE PERSISTENT ASTHMA WITHOUT COMPLICATION: Status: ACTIVE | Noted: 2020-06-24

## 2020-06-26 ENCOUNTER — OFFICE VISIT (OUTPATIENT)
Dept: ALLERGY | Facility: CLINIC | Age: 25
End: 2020-06-26
Attending: FAMILY MEDICINE
Payer: COMMERCIAL

## 2020-06-26 VITALS
OXYGEN SATURATION: 96 % | TEMPERATURE: 98.8 F | DIASTOLIC BLOOD PRESSURE: 75 MMHG | BODY MASS INDEX: 23.33 KG/M2 | HEART RATE: 90 BPM | SYSTOLIC BLOOD PRESSURE: 131 MMHG | WEIGHT: 176.81 LBS

## 2020-06-26 DIAGNOSIS — J30.0 CHRONIC VASOMOTOR RHINITIS: Primary | ICD-10-CM

## 2020-06-26 DIAGNOSIS — R06.09 OTHER FORM OF DYSPNEA: ICD-10-CM

## 2020-06-26 PROCEDURE — 36415 COLL VENOUS BLD VENIPUNCTURE: CPT | Performed by: ALLERGY & IMMUNOLOGY

## 2020-06-26 PROCEDURE — 86003 ALLG SPEC IGE CRUDE XTRC EA: CPT | Mod: 59 | Performed by: ALLERGY & IMMUNOLOGY

## 2020-06-26 PROCEDURE — 82785 ASSAY OF IGE: CPT | Performed by: ALLERGY & IMMUNOLOGY

## 2020-06-26 PROCEDURE — 99244 OFF/OP CNSLTJ NEW/EST MOD 40: CPT | Performed by: ALLERGY & IMMUNOLOGY

## 2020-06-26 PROCEDURE — 99000 SPECIMEN HANDLING OFFICE-LAB: CPT | Performed by: ALLERGY & IMMUNOLOGY

## 2020-06-26 PROCEDURE — 99207 ZZC NO BILLABLE SERVICE THIS VISIT: CPT | Performed by: ALLERGY & IMMUNOLOGY

## 2020-06-26 PROCEDURE — 86003 ALLG SPEC IGE CRUDE XTRC EA: CPT | Mod: 90 | Performed by: ALLERGY & IMMUNOLOGY

## 2020-06-26 RX ORDER — CETIRIZINE HYDROCHLORIDE 10 MG/1
10 TABLET ORAL DAILY
COMMUNITY

## 2020-06-26 RX ORDER — MOMETASONE FUROATE MONOHYDRATE 50 UG/1
2 SPRAY, METERED NASAL DAILY
Qty: 17 G | Refills: 3 | Status: SHIPPED | OUTPATIENT
Start: 2020-06-26 | End: 2022-03-18

## 2020-06-26 RX ORDER — AZELASTINE 1 MG/ML
2 SPRAY, METERED NASAL 2 TIMES DAILY PRN
Qty: 30 ML | Refills: 3 | Status: SHIPPED | OUTPATIENT
Start: 2020-06-26 | End: 2022-03-18

## 2020-06-26 RX ORDER — FLUTICASONE PROPIONATE 110 UG/1
2 AEROSOL, METERED RESPIRATORY (INHALATION) 2 TIMES DAILY
Qty: 1 INHALER | Refills: 11 | Status: SHIPPED | OUTPATIENT
Start: 2020-06-26 | End: 2020-09-09

## 2020-06-26 RX ORDER — ALBUTEROL SULFATE 90 UG/1
2-4 AEROSOL, METERED RESPIRATORY (INHALATION) EVERY 4 HOURS PRN
Qty: 1 INHALER | Refills: 11 | Status: SHIPPED | OUTPATIENT
Start: 2020-06-26 | End: 2020-09-09

## 2020-06-26 ASSESSMENT — ENCOUNTER SYMPTOMS
CHEST TIGHTNESS: 1
ADENOPATHY: 0
ARTHRALGIAS: 0
COUGH: 0
DIARRHEA: 0
FEVER: 0
EYE DISCHARGE: 0
MYALGIAS: 0
RHINORRHEA: 0
FACIAL SWELLING: 0
JOINT SWELLING: 0
SHORTNESS OF BREATH: 1
EYE ITCHING: 0
VOMITING: 0
ACTIVITY CHANGE: 0
WHEEZING: 1
FATIGUE: 0
SINUS PRESSURE: 1
HEADACHES: 0
NAUSEA: 0
EYE REDNESS: 0

## 2020-06-26 NOTE — LETTER
6/26/2020         RE: Edvin Tuttle  [id number] [facility] [address]        Dear Colleague,    Thank you for referring your patient, Edvin Tuttle, to the [facility]. Please see a copy of my visit note below.    SUBJECTIVE:                                                                   Edvin Tuttle is a 25-year-old male who presents today to our Allergy Clinic at [facility]; he is being seen in consultation at the request of Zach Robison MD, for asthma evaluation.  He always had seasonal allergies. They got worse recently. He has perennial but seasonally-exacerbated (Spring and Summer) chronic nasal congestion and sneezing. On occasions, he has ocular symptoms (itching and watering).  He is worse around cats. He develops sneezing, nasal congestion, and watery eyes with exposure. He hasn't noticed the symptoms around the dogs.    Intranasal steroids have not been used consistently. The last use was several months ago, and he used it for about 1 week. He didn't find it helpful. He started cetirizine 10 mg by mouth once daily about 2 months now, and it has been partially helpful.   There is no history of PE tubes, sinus surgeries, or tonsillectomy/adenoidectomy.  He had flu in January. Since then, he has been having episodes chest tightness retrosternally, shortness of breath (he can't take a deep full breath in for complete satisfaction). If he blows out hard, he hears himself wheezing. He admits that wheezing is end-expiratory and is originating from his throat. Rarely he may cough if he takes a deep breath in. When he lies down, shortness of breath is worse, but he never wakes up because of the chest symptoms. He doesn't think that albuterol is helpful, but he feels that Flovent makes him feel better. He uses 110 mcg 1 puff twice daily. The longer he has been using it, the better he feels. He doesn't use a spacer with his inhalers.   He  admits having heartburn once a week. It started at the same time when he began experiencing chest symptoms.   CHEST TWO VIEWS March 3, 2020 10:14 AM     HISTORY: SOB (shortness of breath).     COMPARISON: 2/15/2006.     FINDINGS: Lungs are clear. No airspace consolidation, pneumothorax, or  pleural fluid. Heart size and pulmonary vasculature are normal  appearing. Imaged osseous structures are grossly intact.                                                                      IMPRESSION: No acute cardiopulmonary process.     NIKITA MALDONADO MD      Patient Active Problem List   Diagnosis     Pneumonia     Chronic seasonal allergic rhinitis, unspecified trigger     Moderate persistent asthma without complication       History reviewed. No pertinent past medical history.   Problem (# of Occurrences) Relation (Name,Age of Onset)    Allergies (1) Father    Alzheimer Disease (1) Maternal Grandmother    Asthma (1) Paternal Uncle    Cancer (2) Maternal Grandfather: lung, Paternal Grandfather: Lung    Musculoskeletal Disorder (1) Father: ALS    Other - See Comments (1) Mother (65): throat cancer        History reviewed. No pertinent surgical history.  Social History     Socioeconomic History     Marital status: Single     Spouse name: None     Number of children: None     Years of education: None     Highest education level: None   Occupational History     None   Social Needs     Financial resource strain: None     Food insecurity     Worry: None     Inability: None     Transportation needs     Medical: None     Non-medical: None   Tobacco Use     Smoking status: Never Smoker     Smokeless tobacco: Never Used     Tobacco comment: Parents smoke outside.   Substance and Sexual Activity     Alcohol use: Yes     Comment: 1 Beer daily.     Drug use: No     Sexual activity: Never   Lifestyle     Physical activity     Days per week: None     Minutes per session: None     Stress: None   Relationships     Social connections      Talks on phone: None     Gets together: None     Attends Worship service: None     Active member of club or organization: None     Attends meetings of clubs or organizations: None     Relationship status: None     Intimate partner violence     Fear of current or ex partner: None     Emotionally abused: None     Physically abused: None     Forced sexual activity: None   Other Topics Concern     Parent/sibling w/ CABG, MI or angioplasty before 65F 55M? Not Asked   Social History Narrative    June 26, 2020    ENVIRONMENTAL HISTORY: [place] is heated with a forced air and gas furnace. They do have central air conditioning. The patient's bedroom is furnished with carpeting in bedroom and animals sleep in bedroom.  Pets inside the house include 1 dog(s). There is history of occasional mice. There is/are 0 smokers in the house.  The house does not have a damp basement.            Review of Systems   Constitutional: Negative for activity change, fatigue and fever.   HENT: Positive for congestion, sinus pressure and sneezing. Negative for dental problem, ear pain, facial swelling, nosebleeds, postnasal drip and rhinorrhea.         Aural fullness   Eyes: Negative for discharge, redness and itching.   Respiratory: Positive for chest tightness, shortness of breath and wheezing. Negative for cough.    Cardiovascular: Negative for chest pain.   Gastrointestinal: Negative for diarrhea, nausea and vomiting.   Musculoskeletal: Negative for arthralgias, joint swelling and myalgias.   Skin: Negative for rash.   Neurological: Negative for headaches.   Hematological: Negative for adenopathy.   Psychiatric/Behavioral: Negative for behavioral problems and self-injury.           Current Outpatient Medications:      albuterol (PROAIR HFA/PROVENTIL HFA/VENTOLIN HFA) 108 (90 Base) MCG/ACT inhaler, Inhale 2-4 puffs into the lungs every 4 hours as needed for wheezing, Disp: 1  Inhaler, Rfl: 11     azelastine (ASTELIN) 0.1 % nasal spray, Spray 2 sprays into both nostrils 2 times daily as needed for rhinitis, Disp: 30 mL, Rfl: 3     cetirizine (ZYRTEC) 10 MG tablet, Take 10 mg by mouth daily, Disp: , Rfl:      fluticasone (FLOVENT HFA) 110 MCG/ACT inhaler, Inhale 2 puffs into the lungs 2 times daily, Disp: 1 Inhaler, Rfl: 11     mometasone (NASONEX) 50 MCG/ACT nasal spray, Spray 2 sprays into both nostrils daily, Disp: 17 g, Rfl: 3     omeprazole (PRILOSEC) 20 MG DR capsule, Take 1 capsule (20 mg) by mouth daily, Disp: 60 capsule, Rfl: 0     LORazepam (ATIVAN) 0.5 MG tablet, Take 0.5 mg by mouth every 6 hours as needed for anxiety, Disp: , Rfl:      montelukast (SINGULAIR) 10 MG tablet, Take 1 tablet (10 mg) by mouth At Bedtime (Patient not taking: Reported on 6/26/2020), Disp: 30 tablet, Rfl: 11  Immunization History   Administered Date(s) Administered     DTAP (<7y) 05/25/2000     MMR 05/25/2000     Meningococcal (Menactra ) 07/12/2007     Poliovirus, inactivated (IPV) 05/25/2000     TDAP Vaccine (Adacel) 07/12/2007     Varicella 05/25/2000     No Known Allergies  OBJECTIVE:                                                                 /75 (BP Location: Left arm, Patient Position: Sitting, Cuff Size: Adult Regular)   Pulse 90   Temp 98.8  F (37.1  C) (Tympanic)   Wt 80.2 kg (176 lb 12.9 oz)   SpO2 96%   BMI 23.33 kg/m          Physical Exam  Vitals signs and nursing note reviewed.   Constitutional:       General: He is not in acute distress.     Appearance: He is not diaphoretic.   HENT:      Head: Normocephalic and atraumatic.      Right Ear: Tympanic membrane, ear canal and external ear normal.      Left Ear: Tympanic membrane, ear canal and external ear normal.      Nose: No mucosal edema or rhinorrhea.      Right Turbinates: Not enlarged, swollen or pale.      Left Turbinates: Not enlarged, swollen or pale.      Mouth/Throat:      Lips: Pink.      Mouth: Mucous membranes  are moist.      Pharynx: Oropharynx is clear. No pharyngeal swelling, oropharyngeal exudate or posterior oropharyngeal erythema.   Eyes:      General:         Right eye: No discharge.         Left eye: No discharge.      Conjunctiva/sclera: Conjunctivae normal.   Neck:      Musculoskeletal: Normal range of motion.   Cardiovascular:      Rate and Rhythm: Normal rate and regular rhythm.      Heart sounds: Normal heart sounds. No murmur.   Pulmonary:      Effort: Pulmonary effort is normal. No respiratory distress.      Breath sounds: Normal breath sounds and air entry. No stridor, decreased air movement or transmitted upper airway sounds. No decreased breath sounds, wheezing, rhonchi or rales.   Musculoskeletal: Normal range of motion.   Lymphadenopathy:      Cervical: No cervical adenopathy.   Skin:     General: Skin is warm.      Capillary Refill: Capillary refill takes less than 2 seconds.   Neurological:      Mental Status: He is alert and oriented to person, place, and time.   Psychiatric:         Mood and Affect: Mood normal.         Behavior: Behavior normal.               WORKUP:   ACT Score:  17    ASSESSMENT/PLAN:    Chronic vasomotor rhinitis    Unable to perform SPT for aeroallergens because he took cetirizine until yesterday.  -I ordered serum IgE for regional aeroallergens panel.  -Start Nasonex 2 sprays in each nostril once daily.  -Start azelastine 2 sprays in each nostril twice daily as needed.    - IgE  - Allergen cat epithellium IgE  - Allergen dog epithelium IgE  - Allergen Kuldeep grass IgE  - Allergen orchard grass IgE  - Allergen jenny IgE  - Allergen D farinae IgE  - Allergen D pteronyssinus IgE  - Allergen alternaria alternata IgE  - Allergen aspergillus fumigatus IgE  - Allergen cladosporium herbarum IgE  - Allergen Epicoccum purpurascens IgE  - Allergen penicillium notatum IgE  - Allergen renae white IgE  - Allergen Cedar IgE  - Allergen cottonwood IgE  - Allergen elm IgE  - Allergen maple  box elder IgE  - Allergen oak white IgE  - Allergen Red [place] IgE  - Allergen silver  birch IgE  - Allergen Tree [place] IgE  - Allergen [place] Tree  - Allergen white pine IgE  - Allergen English plantain IgE  - Allergen giant ragweed IgE  - Allergen lamb's quarter IgE  - Allergen Mugwort IgE  - Allergen ragweed short IgE  - Allergen Sagebrush Wormwood IgE  - Allergen Sheep Sorrel IgE  - Allergen thistle Russian IgE  - Allergen Weed Nettle IgE  - Allergen, Kochia/Firebush  - mometasone (NASONEX) 50 MCG/ACT nasal spray  Dispense: 17 g; Refill: 3  - azelastine (ASTELIN) 0.1 % nasal spray  Dispense: 30 mL; Refill: 3    Other form of dyspnea  He does not have symptoms at night.  Reported wheezing is end expiratory, and he is made on a forced expiration by constricting his throat.  He has a great air entry.  Albuterol was not perceived as helpful, although, I acknowledge that he did not use it with a chamber.  He does have heartburn.  It started at the same time when the rest of his symptoms began.  GERD versus VCD/poor respiratory dynamics versus asthma versus multifactorial.  -I ordered full PFTs.  -We will get CBC with differential.  -Continue Flovent.  I recommend increasing it to 2 puffs twice daily.  -Start using albuterol inhaler on as-needed basis, 2 to 4 puffs every 4 hours as needed for persistent cough/chest tightness/wheezing/shortness of breath.  I provided the patient with a chamber device and demonstrated how to use it properly.  -Start omeprazole 20 mg by mouth once daily.      - General PFT Lab (Please always keep checked)  - Pulmonary Function Test  - OPTICHAMBER  - CBC with platelets differential  - omeprazole (PRILOSEC) 20 MG DR capsule  Dispense: 60 capsule; Refill: 0       Return in about 6 weeks (around 8/7/2020), or if symptoms worsen or fail to improve.    Thank you for allowing us to participate in the care of this patient. Please feel free to contact us if there are any questions or  concerns about the patient.    Disclaimer: This note consists of symbols derived from keyboarding, dictation and/or voice recognition software. As a result, there may be errors in the script that have gone undetected. Please consider this when interpreting information found in this chart.    Chapo Samuel MD, FAAAAI, FACAAI  Allergy, Asthma and Immunology  [place], [place] and [place]      Again, thank you for allowing me to participate in the care of your patient.        Sincerely,        Chapo Samuel MD

## 2020-06-26 NOTE — PROGRESS NOTES
SUBJECTIVE:                                                                   Edvin Tuttle is a 25-year-old male who presents today to our Allergy Clinic at [facility]; he is being seen in consultation at the request of Zach Robison MD, for asthma evaluation.  He always had seasonal allergies. They got worse recently. He has perennial but seasonally-exacerbated (Spring and Summer) chronic nasal congestion and sneezing. On occasions, he has ocular symptoms (itching and watering).  He is worse around cats. He develops sneezing, nasal congestion, and watery eyes with exposure. He hasn't noticed the symptoms around the dogs.    Intranasal steroids have not been used consistently. The last use was several months ago, and he used it for about 1 week. He didn't find it helpful. He started cetirizine 10 mg by mouth once daily about 2 months now, and it has been partially helpful.   There is no history of PE tubes, sinus surgeries, or tonsillectomy/adenoidectomy.  He had flu in January. Since then, he has been having episodes of chest tightness retrosternally, shortness of breath (he can't take a deep full breath in for complete satisfaction). If he blows out hard, he hears himself wheezing. He admits that wheezing is end-expiratory and is originating from his throat. Rarely he may cough if he takes a deep breath in. When he lies down, shortness of breath is worse, but he never wakes up because of the chest symptoms. He doesn't think that albuterol is helpful, but he feels that Flovent makes him feel better. He uses 110 mcg 1 puff twice daily. The longer he has been using it, the better he feels. He doesn't use a spacer with his inhalers.   He admits having heartburn once a week. It started at the same time when he began experiencing chest symptoms.   CHEST TWO VIEWS March 3, 2020 10:14 AM     HISTORY: SOB (shortness of breath).     COMPARISON: 2/15/2006.     FINDINGS: Lungs are clear. No  airspace consolidation, pneumothorax, or  pleural fluid. Heart size and pulmonary vasculature are normal  appearing. Imaged osseous structures are grossly intact.                                                                      IMPRESSION: No acute cardiopulmonary process.     NIKITA MALDONADO MD      Patient Active Problem List   Diagnosis     Pneumonia     Chronic seasonal allergic rhinitis, unspecified trigger     Moderate persistent asthma without complication       History reviewed. No pertinent past medical history.   Problem (# of Occurrences) Relation (Name,Age of Onset)    Allergies (1) Father    Alzheimer Disease (1) Maternal Grandmother    Asthma (1) Paternal Uncle    Cancer (2) Maternal Grandfather: lung, Paternal Grandfather: Lung    Musculoskeletal Disorder (1) Father: ALS    Other - See Comments (1) Mother (65): throat cancer        History reviewed. No pertinent surgical history.  Social History     Socioeconomic History     Marital status: Single     Spouse name: None     Number of children: None     Years of education: None     Highest education level: None   Occupational History     None   Social Needs     Financial resource strain: None     Food insecurity     Worry: None     Inability: None     Transportation needs     Medical: None     Non-medical: None   Tobacco Use     Smoking status: Never Smoker     Smokeless tobacco: Never Used     Tobacco comment: Parents smoke outside.   Substance and Sexual Activity     Alcohol use: Yes     Comment: 1 Beer daily.     Drug use: No     Sexual activity: Never   Lifestyle     Physical activity     Days per week: None     Minutes per session: None     Stress: None   Relationships     Social connections     Talks on phone: None     Gets together: None     Attends Anabaptist service: None     Active member of club or organization: None     Attends meetings of clubs or organizations: None     Relationship status: None     Intimate partner violence     Fear  of current or ex partner: None     Emotionally abused: None     Physically abused: None     Forced sexual activity: None   Other Topics Concern     Parent/sibling w/ CABG, MI or angioplasty before 65F 55M? Not Asked   Social History Narrative    June 26, 2020    ENVIRONMENTAL HISTORY: [place] home is heated with a forced air and gas furnace. They do have central air conditioning. The patient's bedroom is furnished with carpeting in bedroom and animals sleep in bedroom.  Pets inside the house include 1 dog(s). There is history of occasional mice. There is/are 0 smokers in the house.  The house does not have a damp basement.            Review of Systems   Constitutional: Negative for activity change, fatigue and fever.   HENT: Positive for congestion, sinus pressure and sneezing. Negative for dental problem, ear pain, facial swelling, nosebleeds, postnasal drip and rhinorrhea.         Aural fullness   Eyes: Negative for discharge, redness and itching.   Respiratory: Positive for chest tightness, shortness of breath and wheezing. Negative for cough.    Cardiovascular: Negative for chest pain.   Gastrointestinal: Negative for diarrhea, nausea and vomiting.   Musculoskeletal: Negative for arthralgias, joint swelling and myalgias.   Skin: Negative for rash.   Neurological: Negative for headaches.   Hematological: Negative for adenopathy.   Psychiatric/Behavioral: Negative for behavioral problems and self-injury.           Current Outpatient Medications:      albuterol (PROAIR HFA/PROVENTIL HFA/VENTOLIN HFA) 108 (90 Base) MCG/ACT inhaler, Inhale 2-4 puffs into the lungs every 4 hours as needed for wheezing, Disp: 1 Inhaler, Rfl: 11     azelastine (ASTELIN) 0.1 % nasal spray, Spray 2 sprays into both nostrils 2 times daily as needed for rhinitis, Disp: 30 mL, Rfl: 3     cetirizine (ZYRTEC) 10 MG tablet, Take 10 mg by mouth daily, Disp: , Rfl:      fluticasone  (FLOVENT HFA) 110 MCG/ACT inhaler, Inhale 2 puffs into the lungs 2 times daily, Disp: 1 Inhaler, Rfl: 11     mometasone (NASONEX) 50 MCG/ACT nasal spray, Spray 2 sprays into both nostrils daily, Disp: 17 g, Rfl: 3     omeprazole (PRILOSEC) 20 MG DR capsule, Take 1 capsule (20 mg) by mouth daily, Disp: 60 capsule, Rfl: 0     LORazepam (ATIVAN) 0.5 MG tablet, Take 0.5 mg by mouth every 6 hours as needed for anxiety, Disp: , Rfl:      montelukast (SINGULAIR) 10 MG tablet, Take 1 tablet (10 mg) by mouth At Bedtime (Patient not taking: Reported on 6/26/2020), Disp: 30 tablet, Rfl: 11  Immunization History   Administered Date(s) Administered     DTAP (<7y) 05/25/2000     MMR 05/25/2000     Meningococcal (Menactra ) 07/12/2007     Poliovirus, inactivated (IPV) 05/25/2000     TDAP Vaccine (Adacel) 07/12/2007     Varicella 05/25/2000     No Known Allergies  OBJECTIVE:                                                                 /75 (BP Location: Left arm, Patient Position: Sitting, Cuff Size: Adult Regular)   Pulse 90   Temp 98.8  F (37.1  C) (Tympanic)   Wt 80.2 kg (176 lb 12.9 oz)   SpO2 96%   BMI 23.33 kg/m          Physical Exam  Vitals signs and nursing note reviewed.   Constitutional:       General: He is not in acute distress.     Appearance: He is not diaphoretic.   HENT:      Head: Normocephalic and atraumatic.      Right Ear: Tympanic membrane, ear canal and external ear normal.      Left Ear: Tympanic membrane, ear canal and external ear normal.      Nose: No mucosal edema or rhinorrhea.      Right Turbinates: Not enlarged, swollen or pale.      Left Turbinates: Not enlarged, swollen or pale.      Mouth/Throat:      Lips: Pink.      Mouth: Mucous membranes are moist.      Pharynx: Oropharynx is clear. No pharyngeal swelling, oropharyngeal exudate or posterior oropharyngeal erythema.   Eyes:      General:         Right eye: No discharge.         Left eye: No discharge.      Conjunctiva/sclera:  Conjunctivae normal.   Neck:      Musculoskeletal: Normal range of motion.   Cardiovascular:      Rate and Rhythm: Normal rate and regular rhythm.      Heart sounds: Normal heart sounds. No murmur.   Pulmonary:      Effort: Pulmonary effort is normal. No respiratory distress.      Breath sounds: Normal breath sounds and air entry. No stridor, decreased air movement or transmitted upper airway sounds. No decreased breath sounds, wheezing, rhonchi or rales.   Musculoskeletal: Normal range of motion.   Lymphadenopathy:      Cervical: No cervical adenopathy.   Skin:     General: Skin is warm.      Capillary Refill: Capillary refill takes less than 2 seconds.   Neurological:      Mental Status: He is alert and oriented to person, place, and time.   Psychiatric:         Mood and Affect: Mood normal.         Behavior: Behavior normal.               WORKUP:   ACT Score:  17    ASSESSMENT/PLAN:    Chronic vasomotor rhinitis    Unable to perform SPT for aeroallergens because he took cetirizine until yesterday.  -I ordered serum IgE for regional aeroallergens panel.  -Start Nasonex 2 sprays in each nostril once daily.  -Start azelastine 2 sprays in each nostril twice daily as needed.    - IgE  - Allergen cat epithellium IgE  - Allergen dog epithelium IgE  - Allergen Kuldeep grass IgE  - Allergen orchard grass IgE  - Allergen jenny IgE  - Allergen D farinae IgE  - Allergen D pteronyssinus IgE  - Allergen alternaria alternata IgE  - Allergen aspergillus fumigatus IgE  - Allergen cladosporium herbarum IgE  - Allergen Epicoccum purpurascens IgE  - Allergen penicillium notatum IgE  - Allergen renae white IgE  - Allergen Cedar IgE  - Allergen cottonwood IgE  - Allergen elm IgE  - Allergen maple box elder IgE  - Allergen oak white IgE  - Allergen Red [place] IgE  - Allergen silver  birch IgE  - Allergen Tree White [place] IgE  - Allergen Maurepas Tree  - Allergen white pine IgE  - Allergen English plantain IgE  - Allergen giant  ragweed IgE  - Allergen lamb's quarter IgE  - Allergen Mugwort IgE  - Allergen ragweed short IgE  - Allergen Sagebrush [place] IgE  - Allergen Sheep Sorrel IgE  - Allergen thistle Russian IgE  - Allergen Weed Nettle IgE  - Allergen, Kochia/Firebush  - mometasone (NASONEX) 50 MCG/ACT nasal spray  Dispense: 17 g; Refill: 3  - azelastine (ASTELIN) 0.1 % nasal spray  Dispense: 30 mL; Refill: 3    Other form of dyspnea  He does not have symptoms at night.  Reported wheezing is end expiratory, and he is made on a forced expiration by constricting his throat.  He has a great air entry.  Albuterol was not perceived as helpful, although, I acknowledge that he did not use it with a chamber.  He does have heartburn.  It started at the same time when the rest of his symptoms began.  GERD versus VCD/poor respiratory dynamics versus asthma versus multifactorial.  -I ordered full PFTs.  -We will get CBC with differential.  -Continue Flovent.  I recommend increasing it to 2 puffs twice daily.  -Start using albuterol inhaler on as-needed basis, 2 to 4 puffs every 4 hours as needed for persistent cough/chest tightness/wheezing/shortness of breath.  I provided the patient with a chamber device and demonstrated how to use it properly.  -Start omeprazole 20 mg by mouth once daily.      - General PFT Lab (Please always keep checked)  - Pulmonary Function Test  - OPTICHAMBER  - CBC with platelets differential  - omeprazole (PRILOSEC) 20 MG DR capsule  Dispense: 60 capsule; Refill: 0       Return in about 6 weeks (around 8/7/2020), or if symptoms worsen or fail to improve.    Thank you for allowing us to participate in the care of this patient. Please feel free to contact us if there are any questions or concerns about the patient.    Disclaimer: This note consists of symbols derived from keyboarding, dictation and/or voice recognition software. As a result, there may be errors in the script that have gone undetected. Please consider this  when interpreting information found in this chart.    Chapo Samuel MD, FAAAAI, FACAAI  Allergy, Asthma and Immunology  [place], [place] and [facility]

## 2020-06-26 NOTE — PATIENT INSTRUCTIONS
Get PFTs done:  [phone]    Start omeprazole 20 mg by mouth once daily.  Increase Flovent to 2 puffs twice daily.  -Start albuterol inhaler 2-4 puffs every 4 hours as needed for chest tightness/wheezing/shortness of breath/persistent cough.  -Use all inhalers with spacer/chamber device.    -start Nasonex 1-2 sprays/each nostril once a day.  -Use azelastine 2 sprays in each nostril twice a day when necessary.

## 2020-06-27 ASSESSMENT — ASTHMA QUESTIONNAIRES: ACT_TOTALSCORE: 17

## 2020-06-28 LAB
C HERBARUM IGE QN: <0.1 KU(A)/L
CEDAR IGE QN: <0.1 KU(A)/L
COMMON RAGWEED IGE QN: 1.22 KU(A)/L
D PTERONYSS IGE QN: <0.1 KU(A)/L
DOG DANDER+EPITH IGE QN: 0.22 KU(A)/L
ENGL PLANTAIN IGE QN: 1.55 KU(A)/L
GOOSEFOOT IGE QN: <0.1 KU(A)/L
MAPLE IGE QN: 0.58 KU(A)/L
MUGWORT IGE QN: 0.52 KU(A)/L
SHEEP SORREL IGE QN: 0.78 KU(A)/L
WHITE OAK IGE QN: 1.78 KU(A)/L

## 2020-06-29 LAB
A ALTERNATA IGE QN: 2.39 KU(A)/L
A FUMIGATUS IGE QN: 0.6 KU(A)/L
CAT DANDER IGG QN: 0.37 KU(A)/L
COCKSFOOT IGE QN: 0.6 KU(A)/L
COTTONWOOD IGE QN: 1.04 KU(A)/L
D FARINAE IGE QN: <0.1 KU(A)/L
E PURPURASCENS IGE QN: 1.2 KU(A)/L
EAST WHITE PINE IGE QN: <0.1 KU(A)/L
FIREBUSH IGE QN: <0.1 KU(A)/L
GIANT RAGWEED IGE QN: 0.54 KU(A)/L
IGE SERPL-ACNC: 32 KIU/L (ref 0–114)
JOHNSON GRASS IGE QN: 0.92 KU(A)/L
NETTLE IGE QN: <0.1 KU(A)/L
P NOTATUM IGE QN: <0.1 KU(A)/L
RED MULBERRY IGE QN: <0.1 KU(A)/L
SALTWORT IGE QN: 1.43 KU(A)/L
SILVER BIRCH IGE QN: 7.5 KU(A)/L
TIMOTHY IGE QN: 1.25 KU(A)/L
WHITE ASH IGE QN: 5.16 KU(A)/L
WHITE ELM IGE QN: 2.17 KU(A)/L
WHITE MULBERRY IGE QN: <0.1 KU(A)/L
WORMWOOD IGE QN: 2.05 KU(A)/L

## 2020-06-30 ENCOUNTER — HOSPITAL ENCOUNTER (OUTPATIENT)
Dept: RESPIRATORY THERAPY | Facility: CLINIC | Age: 25
End: 2020-06-30
Attending: INTERNAL MEDICINE
Payer: COMMERCIAL

## 2020-06-30 DIAGNOSIS — R06.09 OTHER FORM OF DYSPNEA: ICD-10-CM

## 2020-06-30 PROCEDURE — 94060 EVALUATION OF WHEEZING: CPT | Mod: 26 | Performed by: INTERNAL MEDICINE

## 2020-06-30 PROCEDURE — 94729 DIFFUSING CAPACITY: CPT

## 2020-06-30 PROCEDURE — 25000125 ZZHC RX 250: Performed by: ALLERGY & IMMUNOLOGY

## 2020-06-30 PROCEDURE — 94726 PLETHYSMOGRAPHY LUNG VOLUMES: CPT | Mod: 26 | Performed by: INTERNAL MEDICINE

## 2020-06-30 PROCEDURE — 94729 DIFFUSING CAPACITY: CPT | Mod: 26 | Performed by: INTERNAL MEDICINE

## 2020-06-30 PROCEDURE — 94060 EVALUATION OF WHEEZING: CPT

## 2020-06-30 PROCEDURE — 94726 PLETHYSMOGRAPHY LUNG VOLUMES: CPT

## 2020-06-30 RX ORDER — ALBUTEROL SULFATE 0.83 MG/ML
2.5 SOLUTION RESPIRATORY (INHALATION) ONCE
Status: COMPLETED | OUTPATIENT
Start: 2020-06-30 | End: 2020-06-30

## 2020-06-30 RX ADMIN — ALBUTEROL SULFATE 2.5 MG: 2.5 SOLUTION RESPIRATORY (INHALATION) at 14:45

## 2020-07-01 LAB
CALIF WALNUT IGE QN: 2.12 KU/L
DEPRECATED MISC ALLERGEN IGE RAST QL: NORMAL

## 2020-07-03 LAB
MOUSE EPITH IGE QN: <0.1 KU(A)/L
MOUSE URINE PROT IGE QN: <0.1 KU(A)/L

## 2020-07-03 NOTE — RESULT ENCOUNTER NOTE
Viepagehart message sent:    Total serum IgE is within normal limits.  Sensitivity to grass pollen, molds (Alternaria and Epicoccum), tree pollen (white renae, Shoshone, elm, oak, silver birch, and walnut), and weed pollen (English Plantain, ragweed, Sagebrush, and Russian thistle) noted.  Slight to mild sensitivity to cat, dog, Aspergillus mold, additional tree, and weed pollen noted.  -No changes in the treatment plan.  -[place] avoidance measures.

## 2020-07-05 LAB
DLCOUNC-%PRED-PRE: 113 %
DLCOUNC-PRE: 41.1 ML/MIN/MMHG
DLCOUNC-PRED: 36.17 ML/MIN/MMHG
ERV-%PRED-PRE: 91 %
ERV-PRE: 2.01 L
ERV-PRED: 2.19 L
EXPTIME-PRE: 5.98 SEC
FEF2575-%PRED-POST: 132 %
FEF2575-%PRED-PRE: 126 %
FEF2575-POST: 6.89 L/SEC
FEF2575-PRE: 6.61 L/SEC
FEF2575-PRED: 5.21 L/SEC
FEFMAX-%PRED-PRE: 92 %
FEFMAX-PRE: 10.09 L/SEC
FEFMAX-PRED: 10.89 L/SEC
FEV1-%PRED-PRE: 106 %
FEV1-PRE: 5.41 L
FEV1FEV6-PRE: 86 %
FEV1FEV6-PRED: 84 %
FEV1FVC-PRE: 86 %
FEV1FVC-PRED: 84 %
FEV1SVC-PRE: 89 %
FEV1SVC-PRED: 80 %
FIFMAX-PRE: 4.9 L/SEC
FRCPLETH-%PRED-PRE: 102 %
FRCPLETH-PRE: 3.57 L
FRCPLETH-PRED: 3.47 L
FVC-%PRED-PRE: 102 %
FVC-PRE: 6.27 L
FVC-PRED: 6.12 L
GAW-%PRED-PRE: 67 %
GAW-PRE: 0.7 L/S/CMH2O
GAW-PRED: 1.03 L/S/CMH2O
IC-%PRED-PRE: 98 %
IC-PRE: 4.08 L
IC-PRED: 4.14 L
RVPLETH-%PRED-PRE: 88 %
RVPLETH-PRE: 1.55 L
RVPLETH-PRED: 1.75 L
SGAW-%PRED-PRE: 230 %
SGAW-PRE: 0.19 1/CMH2O*S
SGAW-PRED: 0.08 1/CMH2O*S
SRAW-%PRED-PRE: 109 %
SRAW-PRE: 5.21 CMH2O*S
SRAW-PRED: 4.76 CMH2O*S
TLCPLETH-%PRED-PRE: 98 %
TLCPLETH-PRE: 7.65 L
TLCPLETH-PRED: 7.74 L
VA-%PRED-PRE: 103 %
VA-PRE: 7.48 L
VC-%PRED-PRE: 96 %
VC-PRE: 6.1 L
VC-PRED: 6.32 L

## 2020-09-09 ENCOUNTER — OFFICE VISIT (OUTPATIENT)
Dept: FAMILY MEDICINE | Facility: CLINIC | Age: 25
End: 2020-09-09
Payer: COMMERCIAL

## 2020-09-09 VITALS
SYSTOLIC BLOOD PRESSURE: 112 MMHG | OXYGEN SATURATION: 98 % | BODY MASS INDEX: 25.05 KG/M2 | DIASTOLIC BLOOD PRESSURE: 82 MMHG | HEIGHT: 73 IN | WEIGHT: 189 LBS | HEART RATE: 87 BPM | TEMPERATURE: 98.4 F

## 2020-09-09 DIAGNOSIS — K21.9 GASTROESOPHAGEAL REFLUX DISEASE WITHOUT ESOPHAGITIS: Primary | ICD-10-CM

## 2020-09-09 DIAGNOSIS — Z23 NEED FOR PROPHYLACTIC VACCINATION AND INOCULATION AGAINST INFLUENZA: ICD-10-CM

## 2020-09-09 DIAGNOSIS — R06.09 OTHER FORM OF DYSPNEA: ICD-10-CM

## 2020-09-09 PROCEDURE — 90686 IIV4 VACC NO PRSV 0.5 ML IM: CPT | Performed by: FAMILY MEDICINE

## 2020-09-09 PROCEDURE — 90471 IMMUNIZATION ADMIN: CPT | Performed by: FAMILY MEDICINE

## 2020-09-09 PROCEDURE — 99214 OFFICE O/P EST MOD 30 MIN: CPT | Mod: 25 | Performed by: FAMILY MEDICINE

## 2020-09-09 RX ORDER — OMEPRAZOLE 40 MG/1
CAPSULE, DELAYED RELEASE ORAL
Qty: 60 CAPSULE | Refills: 3 | Status: SHIPPED | OUTPATIENT
Start: 2020-09-09 | End: 2021-07-14

## 2020-09-09 ASSESSMENT — MIFFLIN-ST. JEOR: SCORE: 1896.18

## 2020-09-09 NOTE — PATIENT INSTRUCTIONS
Thank you for choosing [facility].  You may be receiving an email and/or telephone survey request from [facility] Customer Experience regarding your visit today.  Please take a few minutes to respond to the survey to let us know how we are doing.      If you have questions or concerns, please contact us via Spunkmobile or you can contact your care team at [phone].    Our Clinic hours are:  Monday 6:40 am  to 7:00 pm  Tuesday -Friday 6:40 am to 5:00 pm    The [place] outpatient lab hours are:  Monday - Friday 6:10 am to 4:45 pm  Saturdays 7:00 am to 11:00 am  Appointments are required, call [phone]    If you have clinical questions after hours or would like to schedule an appointment,  call the clinic at [phone].    (K21.9) Gastroesophageal reflux disease without esophagitis  (primary encounter diagnosis)  Comment:   Plan: omeprazole (PRILOSEC) 40 MG DR capsule        Stop the Albuterol and Flovent and Singulair. For the possible acid reflux increase the dose of Omeprazole from 20 mg to 40 mg daily now.   If you are better but not resolved in 7-10 days, then increase again to 40 mg twice daily, the max dose. If this is not effective then other causes will be sought.   The referral to Pulmonary medicine is done and call for that appt. Avoid NSAIDs, alcohol and spicy food and carbonated beverages. Elevate the head of the bed and   Use smaller meals and looser clothes. See below.     (R06.09) Other form of dyspnea  Comment:   Plan: PULMONARY MEDICINE REFERRAL        See above.

## 2020-09-09 NOTE — PROGRESS NOTES
Subjective     Edvin Tuttle is a 25 year old male who presents to clinic today for the following health issues:    HPI       Concern - BREATHING ISSUE  Onset: Started in January.  Description: Feels short of breath.  Hard to get a breath in.  He doesn't feel comfortable sleeping at night with his breathing.  Deep breath at times can make him burp and some reflux.  Intensity: mild, moderate  Progression of Symptoms:  Slightly better over time.  Accompanying Signs & Symptoms: See above.  Previous history of similar problem: Had a PFT done and was told he doesn't have asthma.  He saw the Allergy doctor and was told his symptoms could possibly be related to reflux or a vocal chord injury.  Precipitating factors:        Worsened by: Bad allergies and congestion cause it to be harder to breath.  Laying down.  Alleviating factors:        Improved by: If he is up and distracted by doing something.  Therapies tried and outcome: He has been prescribed inhalers and this seems to help some but the main feeling is still present.   He is using the steroid nasal spray.   The Omeprazole the last 6 weeks has helped a little.       Current Outpatient Medications   Medication Instructions     albuterol (PROAIR HFA/PROVENTIL HFA/VENTOLIN HFA) 108 (90 Base) MCG/ACT inhaler 2-4 puffs, Inhalation, EVERY 4 HOURS PRN     azelastine (ASTELIN) 0.1 % nasal spray 2 sprays, Both Nostrils, 2 TIMES DAILY PRN     cetirizine (ZYRTEC) 10 mg, Oral, DAILY     fluticasone (FLOVENT HFA) 110 MCG/ACT inhaler 2 puffs, Inhalation, 2 TIMES DAILY     LORazepam (ATIVAN) 0.5 mg, Oral, EVERY 6 HOURS PRN     mometasone (NASONEX) 50 MCG/ACT nasal spray 2 sprays, Both Nostrils, DAILY     montelukast (SINGULAIR) 10 mg, Oral, AT BEDTIME     omeprazole (PRILOSEC) 20 mg, Oral, DAILY       Patient Active Problem List   Diagnosis     Pneumonia     Chronic seasonal allergic rhinitis, unspecified trigger     Moderate persistent asthma without complication       Blood  "pressure 112/82, pulse 87, temperature 98.4  F (36.9  C), temperature source Tympanic, height 1.854 m (6' 1\"), weight 85.7 kg (189 lb), SpO2 98 %.    Exam:  GENERAL APPEARANCE: healthy, alert and no distress  EYES: EOMI,  PERRL  Teeth appear normal.   NECK: no adenopathy, no asymmetry, masses, or scars and thyroid normal to palpation  RESP: lungs clear to auscultation - no rales, rhonchi or wheezes  CV: regular rates and rhythm, normal S1 S2, no S3 or S4 and no murmur, click or rub -  ABDOMEN:  soft, nontender, no HSM or masses and bowel sounds normal  SKIN: no suspicious lesions or rashes  PSYCH: mentation appears normal and affect normal/bright      (K21.9) Gastroesophageal reflux disease without esophagitis  (primary encounter diagnosis)  Comment:   Plan: omeprazole (PRILOSEC) 40 MG DR capsule        Stop the Albuterol and Flovent and Singulair. For the possible acid reflux increase the dose of Omeprazole from 20 mg to 40 mg daily now.   If you are better but not resolved in 7-10 days, then increase again to 40 mg twice daily, the max dose. If this is not effective then other causes will be sought.   The referral to Pulmonary medicine is done and call for that appt. Avoid NSAIDs, alcohol and spicy food and carbonated beverages. Elevate the head of the bed and   Use smaller meals and looser clothes. See below.     (R06.09) Other form of dyspnea  Comment:   Plan: PULMONARY MEDICINE REFERRAL        See above.     Zach Robison MD          "

## 2020-10-23 ENCOUNTER — OFFICE VISIT (OUTPATIENT)
Dept: PULMONOLOGY | Facility: CLINIC | Age: 25
End: 2020-10-23
Payer: COMMERCIAL

## 2020-10-23 VITALS
RESPIRATION RATE: 14 BRPM | BODY MASS INDEX: 25.2 KG/M2 | DIASTOLIC BLOOD PRESSURE: 60 MMHG | OXYGEN SATURATION: 97 % | SYSTOLIC BLOOD PRESSURE: 122 MMHG | HEIGHT: 73 IN | HEART RATE: 102 BPM | WEIGHT: 190.1 LBS

## 2020-10-23 DIAGNOSIS — R06.00 DYSPNEA, UNSPECIFIED TYPE: Primary | ICD-10-CM

## 2020-10-23 PROCEDURE — 99244 OFF/OP CNSLTJ NEW/EST MOD 40: CPT | Performed by: INTERNAL MEDICINE

## 2020-10-23 ASSESSMENT — MIFFLIN-ST. JEOR: SCORE: 1901.17

## 2020-10-23 ASSESSMENT — PAIN SCALES - GENERAL: PAINLEVEL: NO PAIN (0)

## 2020-10-23 NOTE — PROGRESS NOTES
Mr. Tuttle is a generally healthy 25-year-old male, non-smoker, who had no respiratory complaints and very good exercise capacity until January where he and many of his coworkers had flulike symptoms.  This was not severe enough to seek medical attention so there is no viral test to confirm influenza.  He began to feel better but then several weeks later felt he could not obtain a full inspiratory breath.  This did bother him but was not associated with decreased exercise capacity, coughing or wheezing.  However this persisted such that in March he sought medical attention for the first time and had a normal chest x-ray and pulmonary function test.  He still did not improve and then was placed on albuterol for possible asthma.  He took this for a while but did not notice any improvement.  At this point he was making purposeful large inspiratory effort to get a full breath.  He then received short course of prednisone which made him generally feel better although he was not sure that this was actually improving his main symptom but because of possible improvement he was then started on Flovent which he took for quite a while but really has had no benefit.  Because it is a longstanding allergic rhinitis history he was sent to the allergy clinic where blood test confirmed sensitivity to multiple airborne allergens.  He was also started on omeprazole for possible cough associated reflux although coughing was not a major symptom for him.  He has been taking the omeprazole for about 6 weeks and does not have much heartburn but still has the breathing sensation.  He is taking inhaled nasal steroids and nasal decongestant and oral antihistamines which he is taken before and has helped his allergic rhinitis and some watery eye symptoms.  Currently he only coughs occasionally after he takes a full breath.  He feels he does not fall asleep as easily as possible once but once asleep does not awaken for respiratory symptoms.   Very occasionally he has awareness of breathing possible faint wheezing when he is lying down.  This is not associated with shortness of breath or need to sit up.  No chest pain.  During this whole illness his exercise capacity remained unchanged and he is able to jog for couple miles at a time when running with his dog.    Patient gained about 10 pounds over the last 6 months.  He is taking reasonable Covid restrictions.  He never had the respiratory difficulties as a child.  This is the first time he is been given inhalers.  No lower extremity edema, no rash.  No lymphadenopathy.  No easy bruising.  No joint complaints.  No dysphagia.  No ear symptoms. Lives in a basement apartment without obvious odor or dampness.     Social history.  He works in a [employer] store as a .  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    Family history uncle has asthma and uses inhaler.        No past medical history on file.  Current Outpatient Medications   Medication Sig Dispense Refill     cetirizine (ZYRTEC) 10 MG tablet Take 10 mg by mouth daily       omeprazole (PRILOSEC) 40 MG DR capsule Take 1 or 2 daily as directed. 60 capsule 3     azelastine (ASTELIN) 0.1 % nasal spray Spray 2 sprays into both nostrils 2 times daily as needed for rhinitis (Patient not taking: Reported on 10/23/2020) 30 mL 3     LORazepam (ATIVAN) 0.5 MG tablet Take 0.5 mg by mouth every 6 hours as needed for anxiety       mometasone (NASONEX) 50 MCG/ACT nasal spray Spray 2 sprays into both nostrils daily (Patient not taking: Reported on 10/23/2020) 17 g 3     Family History   Problem Relation Age of Onset     Musculoskeletal Disorder Father         ALS     Allergies Father      Alzheimer Disease Maternal Grandmother      Cancer Maternal Grandfather         lung     Cancer Paternal Grandfather         Lung     Other - See Comments Mother 65        throat cancer     Asthma  Paternal Uncle      Social History     Socioeconomic History     Marital status: Single     Spouse name: Not on file     Number of children: Not on file     Years of education: Not on file     Highest education level: Not on file   Occupational History     Not on file   Social Needs     Financial resource strain: Not on file     Food insecurity     Worry: Not on file     Inability: Not on file     Transportation needs     Medical: Not on file     Non-medical: Not on file   Tobacco Use     Smoking status: Never Smoker     Smokeless tobacco: Never Used     Tobacco comment: Parents smoke outside.   Substance and Sexual Activity     Alcohol use: Yes     Comment: 2 Beers daily.     Drug use: No     Sexual activity: Never   Lifestyle     Physical activity     Days per week: Not on file     Minutes per session: Not on file     Stress: Not on file   Relationships     Social connections     Talks on phone: Not on file     Gets together: Not on file     Attends Denominational service: Not on file     Active member of club or organization: Not on file     Attends meetings of clubs or organizations: Not on file     Relationship status: Not on file     Intimate partner violence     Fear of current or ex partner: Not on file     Emotionally abused: Not on file     Physically abused: Not on file     Forced sexual activity: Not on file   Other Topics Concern     Parent/sibling w/ CABG, MI or angioplasty before 65F 55M? Not Asked   Social History Narrative    June 26, 2020    ENVIRONMENTAL HISTORY: [place] The home is heated with a forced air and gas furnace. They do have central air conditioning. The patient's bedroom is furnished with carpeting in bedroom and animals sleep in bedroom.  Pets inside the house include 1 dog. There is history of occasional mice. There are no smokers in the house.  The house does not have a damp basement.      Constitutional: NEGATIVE, fatigue, fever  "but 10# weight gain   Eyes: NEGATIVE for vision changes or irritation and redness.  ENT/Mouth: NEGATIVE for hoarseness and sore throat  CV: NEGATIVE for chest pain, palpitations or chest pressure, lower extremity edema and syncope or near-syncope  Respiratory:  NEGATIVE for significant cough or shortness of breath at rest, hemoptysis, excessive sleepiness  GI: NEGATIVE for nausea, abdominal pain, heartburn has improved. No  change in bowel habits  Musculoskeletal: no arthralgias or joint swelling  Integumentary/Skin: NEGATIVE for rash  Neurological:  NEGATIVE for numbness or tingling and focal weakness  Hemotologic/Lymphatic: NEGATIVE for bleeding disorder and swollen nodes  Allergic/Immunologic: seasonal rhinitis w/o hives  RESPIRATORY EXAM:  /60   Pulse 102   Resp 14   Ht 1.854 m (6' 1\")   Wt 86.2 kg (190 lb 1.6 oz)   SpO2 97%   BMI 25.08 kg/m    Patient is well-nourished and well-appearing  no cough during visit   Moves easily to exam table without dyspnea, voice quality normal  Nares have no obstruction or d/c and normal mucosa.  No aakash-pharyngeal lesions.    No neck masses or thyromegaly or jugular venous distention   Symmetrical chest, normal configuration, without accessory muscle use or tenderness on palpation. Clear breath sounds. No stridor.   Normal S1 and S2 heart sounds without murmur rub or gallop. No limb edema.  No abdominal distension  No neck or supraclavicular adenopathy.   No muscle atrophy. 5/5 lower limb strength bilaterally.  No tremor.  No digit clubbing.  No skin rash.   Affect is normal; cognition is normal.     Chest x-ray obtained on March 3 was reviewed with the patient showing good sized lungs, no infiltrates or pleural effusions or lymphadenopathy.  Pulmonary function test obtained on June 30 were reviewed and shown to the patient.  Good effort.  Vital capacity 6.27 L, 102% of predicted.  FEV1 5.41 L, 106% of predicted with a ratio of 86%.  No significant change with " bronchodilator.  Total lung capacity normal at 98% of predicted and diffusion capacity normal 113% of predicted.  Interpretation is normal pulmonary function test.  He also had pulmonary function test in March although the effort and coordination was not is good they also showed normal vital capacity and FEV1.  Recent CBC shows slightly elevated white cell count but only 2% eosinophils.  Allergy testing shows elevated levels to many environmental antigens.    Review of all his clinic visits show O2 O2 saturations > 95%.     Assessment: Abnormal breathing awareness.  I told him this is a benign condition and does not require treatment.  He does not have asthma.  He might have reflux but cough is not a major symptom for him and I told him he can take as needed proton pump inhibitor or H2 blocker for symptoms of reflux but this is not going to affect his main  respiratory complaint.  I would not prescribe inhalers or inhaled corticosteroids.  He does have seasonal allergies and would continue with his current allergy regimen focusing mostly on his rhinitis.  I told him he has excellent exercise capacity and should continue to exercise on a regular basis to maintain good weight and avoid deconditioning.  He can return with me as needed.    Meño Santos MD, MPH  Associate Professor of Medicine

## 2020-11-10 NOTE — PROGRESS NOTES
Chief Complaint   Patient presents with     Ent Problem     Check throat- symptoms include heartburn and chest pressure - is wondering he has a hiatal hernia     History of Present Illness  Edvin Tuttle is a 25 year old male who presents today for evaluation.  The patient reports intermittent symptoms of throat fullness and chest pressure.  He feels like this causes some shortness of breath at night when he lays down.  His past history is significant for multiple positives on allergy testing.  He is currently being managed by allergy.  He still has his tonsils.  He does report reflux symptoms and does take omeprazole 40 mg once daily in the morning.  He occasionally will eat near bedtime.  He reports intermittent dysphagia.  He denies any odynophagia, pharyngodynia, otalgia, hemoptysis, voice change.  No neck lumps/bumps/swelling.  No unintentional weight loss.  The patient denies any recent intubations or throat procedures.     Past Medical History  Patient Active Problem List   Diagnosis     Pneumonia     Chronic seasonal allergic rhinitis, unspecified trigger     Moderate persistent asthma without complication     Current Medications     Current Outpatient Medications:      cetirizine (ZYRTEC) 10 MG tablet, Take 10 mg by mouth daily, Disp: , Rfl:      famotidine (PEPCID) 40 MG tablet, Take 1 tablet (40 mg) by mouth At Bedtime, Disp: 90 tablet, Rfl: 1     omeprazole (PRILOSEC) 40 MG DR capsule, Take 1 or 2 daily as directed., Disp: 60 capsule, Rfl: 3     azelastine (ASTELIN) 0.1 % nasal spray, Spray 2 sprays into both nostrils 2 times daily as needed for rhinitis (Patient not taking: Reported on 10/23/2020), Disp: 30 mL, Rfl: 3     LORazepam (ATIVAN) 0.5 MG tablet, Take 0.5 mg by mouth every 6 hours as needed for anxiety, Disp: , Rfl:      mometasone (NASONEX) 50 MCG/ACT nasal spray, Spray 2 sprays into both nostrils daily (Patient not taking: Reported on 10/23/2020), Disp: 17 g, Rfl: 3    Allergies  No Known  Allergies    Social History   Social History     Socioeconomic History     Marital status: Single     Spouse name: Not on file     Number of children: Not on file     Years of education: Not on file     Highest education level: Not on file   Occupational History     Not on file   Social Needs     Financial resource strain: Not on file     Food insecurity     Worry: Not on file     Inability: Not on file     Transportation needs     Medical: Not on file     Non-medical: Not on file   Tobacco Use     Smoking status: Never Smoker     Smokeless tobacco: Never Used     Tobacco comment: Parents smoke outside.   Substance and Sexual Activity     Alcohol use: Yes     Comment: 2 Beers daily.     Drug use: No     Sexual activity: Never   Lifestyle     Physical activity     Days per week: Not on file     Minutes per session: Not on file     Stress: Not on file   Relationships     Social connections     Talks on phone: Not on file     Gets together: Not on file     Attends Sikhism service: Not on file     Active member of club or organization: Not on file     Attends meetings of clubs or organizations: Not on file     Relationship status: Not on file     Intimate partner violence     Fear of current or ex partner: Not on file     Emotionally abused: Not on file     Physically abused: Not on file     Forced sexual activity: Not on file   Other Topics Concern     Parent/sibling w/ CABG, MI or angioplasty before 65F 55M? Not Asked   Social History Narrative    June 26, 2020    ENVIRONMENTAL HISTORY: [place] The home is heated with a forced air and gas furnace. They do have central air conditioning. The patient's bedroom is furnished with carpeting in bedroom and animals sleep in bedroom.  Pets inside the house include 1 dog. There is history of occasional mice. There are no smokers in the house.  The house does not have a damp basement.        Family History  Family History  "  Problem Relation Age of Onset     Musculoskeletal Disorder Father         ALS     Allergies Father      Alzheimer Disease Maternal Grandmother      Cancer Maternal Grandfather         lung     Cancer Paternal Grandfather         Lung     Other - See Comments Mother 65        throat cancer     Asthma Paternal Uncle        Review of Systems  As per HPI and PMHx, otherwise 10+ comprehensive system review is negative.    Physical Exam  /83 (BP Location: Right arm, Patient Position: Sitting, Cuff Size: Adult Regular)   Pulse 109   Temp 98.3  F (36.8  C) (Tympanic)   Ht 1.854 m (6' 1\")   Wt 86.2 kg (190 lb)   BMI 25.07 kg/m    GENERAL: Patient is a pleasant, cooperative 25 year old male in no acute distress.  HEAD: Normocephalic, atraumatic.  Hair and scalp are normal.  EYES: Pupils are equal, round, reactive to light and accommodation.  Extraocular movements are intact.  The sclera nonicteric without injection.  The extraocular structures are normal.  EARS: Normal shape and symmetry.  No tenderness when palpating the mastoid or tragal areas bilaterally.  Otoscopic exam reveals a minimal amount of cerumen bilaterally.  The bilateral tympanic membranes are round, intact without evidence of effusion, good landmarks.  No retraction, granulation, or drainage.  NOSE: Nares are patent.  Nasal mucosa is boggy and inflamed with sticky, inflammatory mucus.  Patient does have a rightward nasal septal deviation with spur.  The patient has moderate inferior turbinate hypertrophy and sticky, inflammatory mucus.  No nasal cavity masses, polyps, or mucopurulence on anterior rhinoscopy.  ORAL CAVITY: Dentition is in good repair.  Mucous membranes are moist.  Tongue is mobile, protrudes to the midline.  Palate elevates symmetrically.  Tonsils are 1.5+, symmetric.  No erythema or exudate.  Patient does have some posterior oropharyngeal cobblestoning and a few prominent minor salivary glands of the soft palate.  No oral cavity " or oropharyngeal masses, lesions, ulcerations, leukoplakia.  NECK: Supple, trachea is midline.  There no palpable cervical lymphadenopathy or masses bilaterally.  Palpation of the bilateral parotid and submandibular areas reveal no masses.  No thyromegaly.    NEUROLOGIC: Cranial nerves II through XII are grossly intact.  Voice is strong.  Patient is House-Brackmann I/VI bilaterally.  CARDIOVASCULAR: Extremities are warm and well-perfused.  No significant peripheral edema.  RESPIRATORY: Patient has nonlabored breathing without cough, wheeze, stridor.  PSYCHIATRIC: Patient is alert and oriented.  Mood and affect appear normal.  SKIN: Warm and dry.  No scalp, face, or neck lesions noted.    Procedure: Flexible Laryngoscopy   Indication: Globus sensation    To best visualize the upper airway anatomy and due to the chief complaint and HPI, I proceeded with flexible fiberoptic laryngoscopy examination.  The bilateral nasal cavities were anesthetized and decongested with a mixture of lidocaine and neosynephrine.  The bilateral nasal cavities were examined using a flexible fiberoptic laryngoscope.  There were no nasal cavity masses, polyps, or mucopurulence bilaterally.  The nasal septum to the right with spur.  The nasopharynx had a normal appearance with normal Eustachian tube openings and fossa of Rosenmuller bilaterally.  Minimal adenoid tissue.  There are some posterior oropharyngeal cobblestoning that does extend down the piriforms.  The base of tongue, vallecula, epiglottis, aryepiglottic folds, arytenoids, and piriform sinuses were without mass or lesion.  There is some interarytenoid thickening and some erythema of the bilateral false folds.  The bilateral true vocal folds were symmetrically mobile without nodules or masses.  The visualized portions of the infraglottic and subglottic airway are unremarkable.  The scope was removed.  The patient tolerated the procedure well.    Assessment and Plan    ICD-10-CM    1.  History of gastroesophageal reflux (GERD)  Z87.19 LARYNGOSCOPY FLEX FIBEROPTIC, DIAGNOSTIC     GASTROENTEROLOGY ADULT REF CONSULT ONLY     Speech Therapy Referral     XR Video Swallow with SLP or OT - Order with Speech Therapy Referral     famotidine (PEPCID) 40 MG tablet   2. Shortness of breath  R06.02 LARYNGOSCOPY FLEX FIBEROPTIC, DIAGNOSTIC     GASTROENTEROLOGY ADULT REF CONSULT ONLY     Speech Therapy Referral     XR Video Swallow with SLP or OT - Order with Speech Therapy Referral     famotidine (PEPCID) 40 MG tablet   3. Throat fullness  R68.89 LARYNGOSCOPY FLEX FIBEROPTIC, DIAGNOSTIC     GASTROENTEROLOGY ADULT REF CONSULT ONLY     Speech Therapy Referral     XR Video Swallow with SLP or OT - Order with Speech Therapy Referral     famotidine (PEPCID) 40 MG tablet      It was my pleasure seeing Edvin Tuttle today in clinic.  The patient presents with chest discomfort, intermittent reflux, shortness of breath, and throat fullness.  They be reasonable to obtain a video swallow study with esophagram to rule out any obvious pathology.  He might be a candidate for EGD with or without pH probe testing.  I will refer him to gastroenterology.  Given his allergy history, eosinophilic esophagitis would be in the differential.  This would be best diagnosed on EGD.  We discussed adding Pepcid at nighttime and continue his daily Prilosec.  We discussed not eating or drinking anything 3 to 4 hours prior to bedtime.  I will contact the patient with the swallow study and esophagram results when available.      Gonzales Tijerina MD  Department of Otolarygology-Head and Neck Surgery  [facility]

## 2020-11-11 ENCOUNTER — OFFICE VISIT (OUTPATIENT)
Dept: OTOLARYNGOLOGY | Facility: CLINIC | Age: 25
End: 2020-11-11
Payer: COMMERCIAL

## 2020-11-11 VITALS
WEIGHT: 190 LBS | DIASTOLIC BLOOD PRESSURE: 83 MMHG | HEIGHT: 73 IN | TEMPERATURE: 98.3 F | BODY MASS INDEX: 25.18 KG/M2 | HEART RATE: 109 BPM | SYSTOLIC BLOOD PRESSURE: 130 MMHG

## 2020-11-11 DIAGNOSIS — Z87.19 HISTORY OF GASTROESOPHAGEAL REFLUX (GERD): Primary | ICD-10-CM

## 2020-11-11 DIAGNOSIS — R06.02 SHORTNESS OF BREATH: ICD-10-CM

## 2020-11-11 DIAGNOSIS — R09.89 THROAT FULLNESS: ICD-10-CM

## 2020-11-11 PROCEDURE — 31575 DIAGNOSTIC LARYNGOSCOPY: CPT | Performed by: OTOLARYNGOLOGY

## 2020-11-11 PROCEDURE — 99204 OFFICE O/P NEW MOD 45 MIN: CPT | Mod: 25 | Performed by: OTOLARYNGOLOGY

## 2020-11-11 RX ORDER — FAMOTIDINE 40 MG/1
40 TABLET, FILM COATED ORAL AT BEDTIME
Qty: 90 TABLET | Refills: 1 | Status: SHIPPED | OUTPATIENT
Start: 2020-11-11 | End: 2022-03-18

## 2020-11-11 ASSESSMENT — MIFFLIN-ST. JEOR: SCORE: 1900.71

## 2020-11-11 NOTE — NURSING NOTE
"Initial /83 (BP Location: Right arm, Patient Position: Sitting, Cuff Size: Adult Regular)   Pulse 109   Temp 98.3  F (36.8  C) (Tympanic)   Ht 1.854 m (6' 1\")   Wt 86.2 kg (190 lb)   BMI 25.07 kg/m   Estimated body mass index is 25.07 kg/m  as calculated from the following:    Height as of this encounter: 1.854 m (6' 1\").    Weight as of this encounter: 86.2 kg (190 lb). .    Mary Evans CMA    "

## 2020-11-11 NOTE — PATIENT INSTRUCTIONS
Per physician instructions      If you have questions or concerns on any instructions given to you by your provider today or if you need to schedule an appointment, you can reach us at [phone].

## 2020-11-11 NOTE — LETTER
11/11/2020         RE: Edvin Tuttle  [address] [facility] [address]        Dear Colleague,    Thank you for referring your patient, Edvin Tuttle, to the [facility]. Please see a copy of my visit note below.    Chief Complaint   Patient presents with     Ent Problem     Check throat- symptoms include heartburn and chest pressure - is wondering he has a hiatal hernia     History of Present Illness  Edvin Tuttle is a 25 year old male who presents today for evaluation.  The patient reports intermittent symptoms of throat fullness and chest pressure.  He feels like this causes some shortness of breath at night when he lays down.  His past history is significant for multiple positives on allergy testing.  He is currently being managed by allergy.  He still has his tonsils.  He does report reflux symptoms and does take omeprazole 40 mg once daily in the morning.  He occasionally will eat near bedtime.  He reports intermittent dysphagia.  He denies any odynophagia, pharyngodynia, otalgia, hemoptysis, voice change.  No neck lumps/bumps/swelling.  No unintentional weight loss.  The patient denies any recent intubations or throat procedures.     Past Medical History  Patient Active Problem List   Diagnosis     Pneumonia     Chronic seasonal allergic rhinitis, unspecified trigger     Moderate persistent asthma without complication     Current Medications     Current Outpatient Medications:      cetirizine (ZYRTEC) 10 MG tablet, Take 10 mg by mouth daily, Disp: , Rfl:      famotidine (PEPCID) 40 MG tablet, Take 1 tablet (40 mg) by mouth At Bedtime, Disp: 90 tablet, Rfl: 1     omeprazole (PRILOSEC) 40 MG DR capsule, Take 1 or 2 daily as directed., Disp: 60 capsule, Rfl: 3     azelastine (ASTELIN) 0.1 % nasal spray, Spray 2 sprays into both nostrils 2 times daily as needed for rhinitis (Patient not taking: Reported on 10/23/2020), Disp: 30 mL, Rfl: 3     LORazepam (ATIVAN)  0.5 MG tablet, Take 0.5 mg by mouth every 6 hours as needed for anxiety, Disp: , Rfl:      mometasone (NASONEX) 50 MCG/ACT nasal spray, Spray 2 sprays into both nostrils daily (Patient not taking: Reported on 10/23/2020), Disp: 17 g, Rfl: 3    Allergies  No Known Allergies    Social History   Social History     Socioeconomic History     Marital status: Single     Spouse name: Not on file     Number of children: Not on file     Years of education: Not on file     Highest education level: Not on file   Occupational History     Not on file   Social Needs     Financial resource strain: Not on file     Food insecurity     Worry: Not on file     Inability: Not on file     Transportation needs     Medical: Not on file     Non-medical: Not on file   Tobacco Use     Smoking status: Never Smoker     Smokeless tobacco: Never Used     Tobacco comment: Parents smoke outside.   Substance and Sexual Activity     Alcohol use: Yes     Comment: 2 Beers daily.     Drug use: No     Sexual activity: Never   Lifestyle     Physical activity     Days per week: Not on file     Minutes per session: Not on file     Stress: Not on file   Relationships     Social connections     Talks on phone: Not on file     Gets together: Not on file     Attends [employer] service: Not on file     Active member of club or organization: Not on file     Attends meetings of clubs or organizations: Not on file     Relationship status: Not on file     Intimate partner violence     Fear of current or ex partner: Not on file     Emotionally abused: Not on file     Physically abused: Not on file     Forced sexual activity: Not on file   Other Topics Concern     Parent/sibling w/ CABG, MI or angioplasty before 65F 55M? Not Asked   Social History Narrative    June 26, 2020    ENVIRONMENTAL HISTORY: [place] The home is heated with a forced air and gas furnace. They do have central air conditioning. The patient's  "bedroom is furnished with carpeting in bedroom and animals sleep in bedroom.  Pets inside the house include 1 dog. There is history of occasional mice. There are no smokers in the house.  The house does not have a damp basement.        Family History  Family History   Problem Relation Age of Onset     Musculoskeletal Disorder Father         ALS     Allergies Father      Alzheimer Disease Maternal Grandmother      Cancer Maternal Grandfather         lung     Cancer Paternal Grandfather         Lung     Other - See Comments Mother 65        throat cancer     Asthma Paternal Uncle        Review of Systems  As per HPI and PMHx, otherwise 10+ comprehensive system review is negative.    Physical Exam  /83 (BP Location: Right arm, Patient Position: Sitting, Cuff Size: Adult Regular)   Pulse 109   Temp 98.3  F (36.8  C) (Tympanic)   Ht 1.854 m (6' 1\")   Wt 86.2 kg (190 lb)   BMI 25.07 kg/m    GENERAL: Patient is a pleasant, cooperative 25 year old male in no acute distress.  HEAD: Normocephalic, atraumatic.  Hair and scalp are normal.  EYES: Pupils are equal, round, reactive to light and accommodation.  Extraocular movements are intact.  The sclera nonicteric without injection.  The extraocular structures are normal.  EARS: Normal shape and symmetry.  No tenderness when palpating the mastoid or tragal areas bilaterally.  Otoscopic exam reveals a minimal amount of cerumen bilaterally.  The bilateral tympanic membranes are round, intact without evidence of effusion, good landmarks.  No retraction, granulation, or drainage.  NOSE: Nares are patent.  Nasal mucosa is boggy and inflamed with sticky, inflammatory mucus.  Patient does have a rightward nasal septal deviation with spur.  The patient has moderate inferior turbinate hypertrophy and sticky, inflammatory mucus.  No nasal cavity masses, polyps, or mucopurulence on anterior rhinoscopy.  ORAL CAVITY: Dentition is in good repair.  Mucous membranes are moist.  " Tongue is mobile, protrudes to the midline.  Palate elevates symmetrically.  Tonsils are 1.5+, symmetric.  No erythema or exudate.  Patient does have some posterior oropharyngeal cobblestoning and a few prominent minor salivary glands of the soft palate.  No oral cavity or oropharyngeal masses, lesions, ulcerations, leukoplakia.  NECK: Supple, trachea is midline.  There no palpable cervical lymphadenopathy or masses bilaterally.  Palpation of the bilateral parotid and submandibular areas reveal no masses.  No thyromegaly.    NEUROLOGIC: Cranial nerves II through XII are grossly intact.  Voice is strong.  Patient is House-Brackmann I/VI bilaterally.  CARDIOVASCULAR: Extremities are warm and well-perfused.  No significant peripheral edema.  RESPIRATORY: Patient has nonlabored breathing without cough, wheeze, stridor.  PSYCHIATRIC: Patient is alert and oriented.  Mood and affect appear normal.  SKIN: Warm and dry.  No scalp, face, or neck lesions noted.    Procedure: Flexible Laryngoscopy   Indication: Globus sensation    To best visualize the upper airway anatomy and due to the chief complaint and HPI, I proceeded with flexible fiberoptic laryngoscopy examination.  The bilateral nasal cavities were anesthetized and decongested with a mixture of lidocaine and neosynephrine.  The bilateral nasal cavities were examined using a flexible fiberoptic laryngoscope.  There were no nasal cavity masses, polyps, or mucopurulence bilaterally.  The nasal septum to the right with spur.  The nasopharynx had a normal appearance with normal Eustachian tube openings and fossa of Rosenmuller bilaterally.  Minimal adenoid tissue.  There are some posterior oropharyngeal cobblestoning that does extend down the piriforms.  The base of tongue, vallecula, epiglottis, aryepiglottic folds, arytenoids, and piriform sinuses were without mass or lesion.  There is some interarytenoid thickening and some erythema of the bilateral false folds.  The  bilateral true vocal folds were symmetrically mobile without nodules or masses.  The visualized portions of the infraglottic and subglottic airway are unremarkable.  The scope was removed.  The patient tolerated the procedure well.    Assessment and Plan    ICD-10-CM    1. History of gastroesophageal reflux (GERD)  Z87.19 LARYNGOSCOPY FLEX FIBEROPTIC, DIAGNOSTIC     GASTROENTEROLOGY ADULT REF CONSULT ONLY     Speech Therapy Referral     XR Video Swallow with SLP or OT - Order with Speech Therapy Referral     famotidine (PEPCID) 40 MG tablet   2. Shortness of breath  R06.02 LARYNGOSCOPY FLEX FIBEROPTIC, DIAGNOSTIC     GASTROENTEROLOGY ADULT REF CONSULT ONLY     Speech Therapy Referral     XR Video Swallow with SLP or OT - Order with Speech Therapy Referral     famotidine (PEPCID) 40 MG tablet   3. Throat fullness  R68.89 LARYNGOSCOPY FLEX FIBEROPTIC, DIAGNOSTIC     GASTROENTEROLOGY ADULT REF CONSULT ONLY     Speech Therapy Referral     XR Video Swallow with SLP or OT - Order with Speech Therapy Referral     famotidine (PEPCID) 40 MG tablet      It was my pleasure seeing Edvin Tuttle today in clinic.  The patient presents with chest discomfort, intermittent reflux, shortness of breath, and throat fullness.  They be reasonable to obtain a video swallow study with esophagram to rule out any obvious pathology.  He might be a candidate for EGD with or without pH probe testing.  I will refer him to gastroenterology.  Given his allergy history, eosinophilic esophagitis would be in the differential.  This would be best diagnosed on EGD.  We discussed adding Pepcid at nighttime and continue his daily Prilosec.  We discussed not eating or drinking anything 3 to 4 hours prior to bedtime.  I will contact the patient with the swallow study and esophagram results when available.      Gonzales Tijerina MD  Department of Otolarygology-Head and Neck Surgery  [facility]       This laryngoscopy scope was used on this  patient.    Flexible    [employer] Flexible #[id number] Adult  Mary Evans CMA          Again, thank you for allowing me to participate in the care of your patient.        Sincerely,        Gonzales Tijerina MD

## 2020-11-12 ENCOUNTER — TELEPHONE (OUTPATIENT)
Dept: FAMILY MEDICINE | Facility: CLINIC | Age: 25
End: 2020-11-12

## 2020-11-12 NOTE — LETTER
November 12, 2020      Edvin Tuttle  [address] [facility] [address] [facility] [address]        Dear Edvin,     Your [facility] Care Team works hard to make sure that you and your family receive exceptional care. Enclosed you will find a copy of the Asthma Control Test (ACT) that our clinic uses to monitor and manage your asthma. This test is an assessment tool that we use to determine how well your asthma is controlled.  Please complete the enclosed form and return in the provided envelope.  Thank you for trusting us with your health care.    Sincerely,        Your [facility] Care Team/krystle

## 2020-11-12 NOTE — TELEPHONE ENCOUNTER
Patient Quality Outreach Summary      Summary:    Patient is due/failing the following:   ACT needed    Type of outreach:    Sent letter.    Questions for provider review:    None                                                                                                                    YULI Bishop MA

## 2020-11-16 ENCOUNTER — HEALTH MAINTENANCE LETTER (OUTPATIENT)
Age: 25
End: 2020-11-16

## 2020-11-18 ENCOUNTER — TELEPHONE (OUTPATIENT)
Dept: OTOLARYNGOLOGY | Facility: CLINIC | Age: 25
End: 2020-11-18

## 2020-11-18 DIAGNOSIS — Z87.19 HISTORY OF GASTROESOPHAGEAL REFLUX (GERD): ICD-10-CM

## 2020-11-18 DIAGNOSIS — R09.89 THROAT FULLNESS: Primary | ICD-10-CM

## 2020-11-18 DIAGNOSIS — R06.02 SHORTNESS OF BREATH: ICD-10-CM

## 2020-11-18 NOTE — TELEPHONE ENCOUNTER
This was my oversight.  I thought I ordered it but it was not signed.  I ordered the esophagram if he wants to schedule.     IJL

## 2020-11-18 NOTE — TELEPHONE ENCOUNTER
Reason for Call:  Other call back    Detailed comments: Pt calling - has an appt but it was just scheduled for a video swallow - thought it was supposed to be for an esophogram as well - but was told this wasn't ordered.    Phone Number Patient can be reached at: Home number on file [phone] (home)    Best Time:     Can we leave a detailed message on this number? YES    Call taken on 11/18/2020 at 1:54 PM by Rukhsana Wahl

## 2020-11-20 ENCOUNTER — HOSPITAL ENCOUNTER (OUTPATIENT)
Dept: GENERAL RADIOLOGY | Facility: CLINIC | Age: 25
End: 2020-11-20
Attending: OTOLARYNGOLOGY
Payer: COMMERCIAL

## 2020-11-20 ENCOUNTER — HOSPITAL ENCOUNTER (OUTPATIENT)
Dept: SPEECH THERAPY | Facility: CLINIC | Age: 25
Setting detail: THERAPIES SERIES
End: 2020-11-20
Attending: OTOLARYNGOLOGY
Payer: COMMERCIAL

## 2020-11-20 DIAGNOSIS — R06.02 SHORTNESS OF BREATH: ICD-10-CM

## 2020-11-20 DIAGNOSIS — Z87.19 HISTORY OF GASTROESOPHAGEAL REFLUX (GERD): ICD-10-CM

## 2020-11-20 DIAGNOSIS — R09.89 THROAT FULLNESS: ICD-10-CM

## 2020-11-20 PROCEDURE — 92611 MOTION FLUOROSCOPY/SWALLOW: CPT | Mod: GN | Performed by: SPEECH-LANGUAGE PATHOLOGIST

## 2020-11-20 PROCEDURE — 74220 X-RAY XM ESOPHAGUS 1CNTRST: CPT

## 2020-11-20 PROCEDURE — 74230 X-RAY XM SWLNG FUNCJ C+: CPT

## 2020-11-20 NOTE — PROGRESS NOTES
Impression:   Oral and pharyngeal stages of swallow are WNL.       Video Swallow Study  11/20/20    General Information   Type Of Visit Initial   Start Of Care Date 11/20/20   Referring Physician Gonzales Tijerina MD   Orders Evaluate And Treat   Medical Diagnosis Dysphagia   Onset Of Illness/injury Or Date Of Surgery 11/11/20  (order date)   Precautions/limitations No Known Precautions/limitations   Hearing WNL   Pertinent History of Current Problem/OT: Additional Occupational Profile Info Pt is referred for a video swallow and esophagram by his ENT to assess swallow function.  Pt reports PMH of GERD and breathing problems starting this past Spring 2020.  He reports no difficulties with initiating the swallow, feeling of sticking in his throat or choking.  He relates that after he swallows some food/liquid will come back up to his mouth.   Respiratory Status Room air   Prior Level Of Function Comment eats a regular consistency diet.   Living Environment [facility]   General Observations Pt alert and cooperative.   Patient/family Goals Have test completed to try and find answers to source of Dysphagia   Pain Assessment   Pain Reported No   Fall Risk Screen   Fall screen completed by SLP   Have you fallen 2 or more times in the past year? No   Have you fallen and had an injury in the past year? No   Is patient a fall risk? No   Abuse Screen (yes response referral indicated)   Feels Unsafe at Home or Work/School no   Feels Threatened by Someone no   Does Anyone Try to Keep You From Having Contact with Others or Doing Things Outside Your Home? no   Physical Signs of Abuse Present no   Clinical Swallow Evaluation   Oral Musculature generally intact   Structural Abnormalities none present   Dentition present and adequate   Mucosal Quality good   Mandibular Strength and Mobility intact   Oral Labial Strength and Mobility WFL   Lingual Strength and Mobility WFL   Velar Elevation intact   Laryngeal Function Cough;Throat  clear;Swallow;Voicing initiated  (WNL)   Oral Musculature Comments WNL   VFSS Eval: Radiology   Radiologist Dr. Ford   Views Taken left lateral   Physical Location of Procedure [facility]   VFSS Eval: Thin Liquid Texture Trial   Mode of Presentation, Thin Liquid cup;self-fed   Order of Presentation 1   Preparatory Phase WFL   Rosenbek's Penetration Aspiration Scale: Thin Liquid Trial Results 1 - no aspiration, contrast does not enter airway   Diagnostic Statement WN   VFSS Eval: Pudding Thick Liquid Texture Trial   Mode of Presentation, Pudding spoon;fed by clinician   Order of Presentation 2   Preparatory Phase WFL   Oral Phase, Pudding WFL   Pharyngeal Phase, Pudding WFL   Rosenbek's Penetration Aspiration Scale: Pudding-Thick Liquid Trial Results 1 - no aspiration, contrast does not enter airway   Diagnostic Statement WN   VFSS Eval: Solid Food Texture Trial   Mode of Presentation, Solid self-fed   Order of Presentation 3   Preparatory Phase WFL   Oral Phase, Solid WFL   Pharyngeal Phase, Solid WFL   Rosenbek's Penetration Aspiration Scale: Solid Food Trial Results 1 - no aspiration, contrast does not enter airway   Diagnostic Statement WN   Swallow Compensations   Swallow Compensations No compensations were used   Educational Assessment   Barriers to Learning No barriers   Esophageal Phase of Swallow   Patient reports or presents with symptoms of esophageal dysphagia Yes   Esophageal sweep performed during today s vidofluoroscopic exam  Please refer to radiologist's report for details  (Esophagram after video swallow)   Esophageal comments Radiologist reports normal esophagram   Swallow Eval: Clinical Impressions   Skilled Criteria for Therapy Intervention No problems identified which require skilled intervention   Functional Assessment Scale (FAS) 7   Dysphagia Outcome Severity Scale (EDWARD) Level 7 - EDWARD   Diet texture recommendations Regular diet   Clinical Impression Comments  Oral and pharyngeal stages of swallow are WNL.  Swallow is timely with no penetration, aspiration, or pharyngeal residue.   Total Session Time   SLP Eval: VideoFluoroscopic Swallow function Minutes (62126) 20   Total Evaluation Time 20

## 2020-11-23 ENCOUNTER — HOSPITAL ENCOUNTER (EMERGENCY)
Facility: CLINIC | Age: 25
Discharge: HOME | End: 2020-11-23
Attending: EMERGENCY MEDICINE | Admitting: EMERGENCY MEDICINE
Payer: COMMERCIAL

## 2020-11-23 VITALS
HEART RATE: 82 BPM | SYSTOLIC BLOOD PRESSURE: 138 MMHG | BODY MASS INDEX: 25.07 KG/M2 | DIASTOLIC BLOOD PRESSURE: 88 MMHG | TEMPERATURE: 98.2 F | WEIGHT: 190 LBS | RESPIRATION RATE: 10 BRPM | OXYGEN SATURATION: 96 %

## 2020-11-23 DIAGNOSIS — R07.9 ACUTE CHEST PAIN: ICD-10-CM

## 2020-11-23 DIAGNOSIS — R00.2 PALPITATIONS: ICD-10-CM

## 2020-11-23 LAB
ANION GAP SERPL CALCULATED.3IONS-SCNC: 8 MMOL/L (ref 3–14)
BASOPHILS # BLD AUTO: 0 10E9/L (ref 0–0.2)
BASOPHILS NFR BLD AUTO: 0.5 %
BUN SERPL-MCNC: 11 MG/DL (ref 7–30)
CALCIUM SERPL-MCNC: 9.4 MG/DL (ref 8.5–10.1)
CHLORIDE SERPL-SCNC: 102 MMOL/L (ref 94–109)
CO2 SERPL-SCNC: 23 MMOL/L (ref 20–32)
CREAT SERPL-MCNC: 0.94 MG/DL (ref 0.66–1.25)
D DIMER PPP FEU-MCNC: <0.3 UG/ML FEU (ref 0–0.5)
DIFFERENTIAL METHOD BLD: NORMAL
EOSINOPHIL # BLD AUTO: 0.1 10E9/L (ref 0–0.7)
EOSINOPHIL NFR BLD AUTO: 0.6 %
ERYTHROCYTE [DISTWIDTH] IN BLOOD BY AUTOMATED COUNT: 11.5 % (ref 10–15)
GFR SERPL CREATININE-BSD FRML MDRD: >90 ML/MIN/{1.73_M2}
GLUCOSE SERPL-MCNC: 121 MG/DL (ref 70–99)
HCT VFR BLD AUTO: 42.6 % (ref 40–53)
HGB BLD-MCNC: 14.9 G/DL (ref 13.3–17.7)
IMM GRANULOCYTES # BLD: 0.1 10E9/L (ref 0–0.4)
IMM GRANULOCYTES NFR BLD: 0.7 %
LYMPHOCYTES # BLD AUTO: 1 10E9/L (ref 0.8–5.3)
LYMPHOCYTES NFR BLD AUTO: 11.3 %
MAGNESIUM SERPL-MCNC: 2 MG/DL (ref 1.6–2.3)
MCH RBC QN AUTO: 32.8 PG (ref 26.5–33)
MCHC RBC AUTO-ENTMCNC: 35 G/DL (ref 31.5–36.5)
MCV RBC AUTO: 94 FL (ref 78–100)
MONOCYTES # BLD AUTO: 0.5 10E9/L (ref 0–1.3)
MONOCYTES NFR BLD AUTO: 5.2 %
NEUTROPHILS # BLD AUTO: 7.2 10E9/L (ref 1.6–8.3)
NEUTROPHILS NFR BLD AUTO: 81.7 %
NRBC # BLD AUTO: 0 10*3/UL
NRBC BLD AUTO-RTO: 0 /100
PLATELET # BLD AUTO: 198 10E9/L (ref 150–450)
POTASSIUM SERPL-SCNC: 3.7 MMOL/L (ref 3.4–5.3)
RBC # BLD AUTO: 4.54 10E12/L (ref 4.4–5.9)
SODIUM SERPL-SCNC: 133 MMOL/L (ref 133–144)
TROPONIN I SERPL-MCNC: <0.015 UG/L (ref 0–0.04)
TSH SERPL DL<=0.005 MIU/L-ACNC: 1.06 MU/L (ref 0.4–4)
WBC # BLD AUTO: 8.8 10E9/L (ref 4–11)

## 2020-11-23 PROCEDURE — 93010 ELECTROCARDIOGRAM REPORT: CPT | Performed by: EMERGENCY MEDICINE

## 2020-11-23 PROCEDURE — 85025 COMPLETE CBC W/AUTO DIFF WBC: CPT | Performed by: EMERGENCY MEDICINE

## 2020-11-23 PROCEDURE — 93005 ELECTROCARDIOGRAM TRACING: CPT | Performed by: EMERGENCY MEDICINE

## 2020-11-23 PROCEDURE — 84443 ASSAY THYROID STIM HORMONE: CPT | Performed by: EMERGENCY MEDICINE

## 2020-11-23 PROCEDURE — 84484 ASSAY OF TROPONIN QUANT: CPT | Performed by: EMERGENCY MEDICINE

## 2020-11-23 PROCEDURE — 85379 FIBRIN DEGRADATION QUANT: CPT | Performed by: EMERGENCY MEDICINE

## 2020-11-23 PROCEDURE — 99285 EMERGENCY DEPT VISIT HI MDM: CPT | Mod: 25 | Performed by: EMERGENCY MEDICINE

## 2020-11-23 PROCEDURE — 80048 BASIC METABOLIC PNL TOTAL CA: CPT | Performed by: EMERGENCY MEDICINE

## 2020-11-23 PROCEDURE — 83735 ASSAY OF MAGNESIUM: CPT | Performed by: EMERGENCY MEDICINE

## 2020-11-23 RX ORDER — LORAZEPAM 1 MG/1
.5-1 TABLET ORAL EVERY 8 HOURS PRN
Qty: 12 TABLET | Refills: 0 | Status: SHIPPED | OUTPATIENT
Start: 2020-11-23

## 2020-11-23 NOTE — ED PROVIDER NOTES
History     Chief Complaint   Patient presents with     Dizziness     Pt was out for a walk with his dog and had a dizzy spell, pt denies any syncopal episode but it scared him and now has chest tightness.     HPI  Edvin Tuttle is a 25 year old male with history of anxiety who developed lightheadedness, rapid forceful palpitations, feeling shaky, bilateral finger and leg tingling, and chest tightness at rest while sitting at a computer after walking his dog earlier.  Asymptomatic while walking his dog and no history of exertional chest pains. Last week he had an episode of heart pounding and racing with a HR of 110.  He takes CBD oil (OTC, oral) for his breathing and anxiety, last use this am. In the past months he has had frequent breathing issues with sensation he needs to control his breathing and 'manually override' his breathing rate and chest tightness.  No localized chest pain, cough, hemoptysis or leg pain or leg swelling.  No known risk factors for DVT/PE.      Allergies:  No Known Allergies    Problem List:    Patient Active Problem List    Diagnosis Date Noted     Moderate persistent asthma without complication 06/24/2020     Priority: Medium     Chronic seasonal allergic rhinitis, unspecified trigger 10/16/2017     Priority: Medium     Pneumonia 07/07/2010     Priority: Medium        Past Medical History:    History reviewed. No pertinent past medical history.    Past Surgical History:    History reviewed. No pertinent surgical history.    Family History:    Family History   Problem Relation Age of Onset     Musculoskeletal Disorder Father         ALS     Allergies Father      Alzheimer Disease Maternal Grandmother      Cancer Maternal Grandfather         lung     Cancer Paternal Grandfather         Lung     Other - See Comments Mother 65        throat cancer     Asthma Paternal Uncle        Social History:  Marital Status:  Single [1]  Social History     Tobacco Use     Smoking status: Never  Smoker     Smokeless tobacco: Never Used     Tobacco comment: Parents smoke outside.   Substance Use Topics     Alcohol use: Yes     Comment: 2 Beers daily.     Drug use: No        Medications:         LORazepam (ATIVAN) 1 MG tablet       azelastine (ASTELIN) 0.1 % nasal spray       cetirizine (ZYRTEC) 10 MG tablet       famotidine (PEPCID) 40 MG tablet       mometasone (NASONEX) 50 MCG/ACT nasal spray       omeprazole (PRILOSEC) 40 MG DR capsule        Review of Systems  As mentioned above in the history present illness.  All other systems were reviewed and are negative.    Physical Exam   BP: (!) 160/98  Pulse: 104  Temp: 98.2  F (36.8  C)  Resp: 18  Weight: 86.2 kg (190 lb)  SpO2: 100 %      Physical Exam  Vitals signs and nursing note reviewed.   Constitutional:       General: He is not in acute distress.     Appearance: Normal appearance. He is well-developed. He is not ill-appearing or diaphoretic.   HENT:      Head: Normocephalic and atraumatic.      Right Ear: External ear normal.      Left Ear: External ear normal.      Nose: Nose normal.      Mouth/Throat:      Mouth: Mucous membranes are moist.   Eyes:      General: No scleral icterus.     Extraocular Movements: Extraocular movements intact.      Conjunctiva/sclera: Conjunctivae normal.   Neck:      Musculoskeletal: Normal range of motion and neck supple.      Thyroid: No thyromegaly.      Trachea: No tracheal deviation.   Cardiovascular:      Rate and Rhythm: Normal rate and regular rhythm.      Heart sounds: Normal heart sounds. No murmur. No friction rub. No gallop.    Pulmonary:      Effort: Pulmonary effort is normal. No respiratory distress.      Breath sounds: Normal breath sounds. No stridor. No wheezing, rhonchi or rales.   Abdominal:      General: There is no distension.      Palpations: Abdomen is soft.      Tenderness: There is no abdominal tenderness.   Musculoskeletal: Normal range of motion.         General: No swelling or tenderness.       Right lower leg: No edema.      Left lower leg: No edema.   Skin:     General: Skin is warm and dry.      Coloration: Skin is not pale.      Findings: No erythema or rash.   Neurological:      General: No focal deficit present.      Mental Status: He is alert and oriented to person, place, and time.      Coordination: Coordination normal.   Psychiatric:         Behavior: Behavior normal.      Comments: Anxious affect.         ED Course        Procedures               EKG Interpretation:      Interpreted by Payam Mary MD  Time reviewed: Upon completion  Symptoms at time of EKG: Chest pain and palpitation  Rhythm: normal sinus   Rate: normal  Axis: normal  Ectopy: none  Conduction: normal  ST Segments/ T Waves: No ST-T wave changes  Q Waves: none  Comparison to prior: No previous EKGs available for comparison.  Clinical Impression: normal EKG         Results for orders placed or performed during the hospital encounter of 11/23/20 (from the past 24 hour(s))   Basic metabolic panel   Result Value Ref Range    Sodium 133 133 - 144 mmol/L    Potassium 3.7 3.4 - 5.3 mmol/L    Chloride 102 94 - 109 mmol/L    Carbon Dioxide 23 20 - 32 mmol/L    Anion Gap 8 3 - 14 mmol/L    Glucose 121 (H) 70 - 99 mg/dL    Urea Nitrogen 11 7 - 30 mg/dL    Creatinine 0.94 0.66 - 1.25 mg/dL    GFR Estimate >90 >60 mL/min/[1.73_m2]    GFR Estimate If Black >90 >60 mL/min/[1.73_m2]    Calcium 9.4 8.5 - 10.1 mg/dL   CBC with platelets differential   Result Value Ref Range    WBC 8.8 4.0 - 11.0 10e9/L    RBC Count 4.54 4.4 - 5.9 10e12/L    Hemoglobin 14.9 13.3 - 17.7 g/dL    Hematocrit 42.6 40.0 - 53.0 %    MCV 94 78 - 100 fl    MCH 32.8 26.5 - 33.0 pg    MCHC 35.0 31.5 - 36.5 g/dL    RDW 11.5 10.0 - 15.0 %    Platelet Count 198 150 - 450 10e9/L    Diff Method Automated Method     % Neutrophils 81.7 %    % Lymphocytes 11.3 %    % Monocytes 5.2 %    % Eosinophils 0.6 %    % Basophils 0.5 %    % Immature Granulocytes 0.7 %    Nucleated RBCs 0 0  /100    Absolute Neutrophil 7.2 1.6 - 8.3 10e9/L    Absolute Lymphocytes 1.0 0.8 - 5.3 10e9/L    Absolute Monocytes 0.5 0.0 - 1.3 10e9/L    Absolute Eosinophils 0.1 0.0 - 0.7 10e9/L    Absolute Basophils 0.0 0.0 - 0.2 10e9/L    Abs Immature Granulocytes 0.1 0 - 0.4 10e9/L    Absolute Nucleated RBC 0.0    TSH with free T4 reflex   Result Value Ref Range    TSH 1.06 0.40 - 4.00 mU/L   Magnesium   Result Value Ref Range    Magnesium 2.0 1.6 - 2.3 mg/dL   D dimer quantitative   Result Value Ref Range    D Dimer <0.3 0.0 - 0.50 ug/ml FEU   Troponin I   Result Value Ref Range    Troponin I ES <0.015 0.000 - 0.045 ug/L       Medications - No data to display    Assessments & Plan (with Medical Decision Making)   25-year-old male with chronic dyspnea who presents with acute atypical chest pain and palpitations with other symptoms consistent with an anxiety attack.  Doubt atypical ACS, malignant dysrhythmia, PE/DVT or emergent disease process.  EKG and laboratory evaluation is unremarkable, including troponin and D-dimer.  Do not feel that imaging evaluation is currently indicated or would be helpful.  He was counseled on anxiety disorder and I recommend he follow-up with a primary care provider soon as possible in the near future to discuss anxiolytic therapy. He was provided instructions for supportive care and will return as needed for worsened condition or worsening symptoms, or new problems or concerns.      I have reviewed the nursing notes.    I have reviewed the findings, diagnosis, plan and need for follow up with the patient.    Discharge Medication List as of 2020  3:22 PM   Lorazepam/Ativan 1 mg tablet X 12 tablets.  No refills.  Si/2 to 1 tablet p.o. every 8 hours as needed anxiety or insomnia.       Final diagnoses:   Palpitations   Acute chest pain       2020   [facility] EMERGENCY DEPT     Payam Mary MD  [phone]

## 2020-11-23 NOTE — ED NOTES
Patient reports being out walking his dog and becoming dizzy. States feeling like he was almost going to pass out. Patient now complaints of chest tightness and a bounding pulse. Is concerned he is anxious. Currently denies dizziness or loss in vision. Had a similar episode a week ago and left work due to symptoms. Has decreased coffee intake and started CBD oil.

## 2020-11-23 NOTE — ED AVS SNAPSHOT
[facility]  [address] [address]  Phone: [phone]  Fax: [phone]                                    Edvin Tuttle   MRN: [id number]    Department: [facility]   Date of Visit: 11/23/2020           After Visit Summary Signature Page    I have received my discharge instructions, and my questions have been answered. I have discussed any challenges I see with this plan with the nurse or doctor.    ..........................................................................................................................................  Patient/Patient Representative Signature      ..........................................................................................................................................  Patient Representative Print Name and Relationship to Patient    ..................................................               ................................................  Date                                   Time    ..........................................................................................................................................  Reviewed by Signature/Title    ...................................................              ..............................................  Date                                               Time          22EPIC Rev 08/18

## 2020-11-27 ENCOUNTER — OFFICE VISIT (OUTPATIENT)
Dept: FAMILY MEDICINE | Facility: CLINIC | Age: 25
End: 2020-11-27
Payer: COMMERCIAL

## 2020-11-27 VITALS
BODY MASS INDEX: 24.47 KG/M2 | TEMPERATURE: 97.5 F | WEIGHT: 185.5 LBS | OXYGEN SATURATION: 99 % | RESPIRATION RATE: 16 BRPM | HEART RATE: 95 BPM | DIASTOLIC BLOOD PRESSURE: 90 MMHG | SYSTOLIC BLOOD PRESSURE: 150 MMHG

## 2020-11-27 DIAGNOSIS — Z23 ENCOUNTER FOR IMMUNIZATION: Primary | ICD-10-CM

## 2020-11-27 DIAGNOSIS — F41.1 GAD (GENERALIZED ANXIETY DISORDER): ICD-10-CM

## 2020-11-27 DIAGNOSIS — R06.00 DYSPNEA, UNSPECIFIED TYPE: ICD-10-CM

## 2020-11-27 PROCEDURE — 90471 IMMUNIZATION ADMIN: CPT | Performed by: FAMILY MEDICINE

## 2020-11-27 PROCEDURE — 90715 TDAP VACCINE 7 YRS/> IM: CPT | Performed by: FAMILY MEDICINE

## 2020-11-27 PROCEDURE — 99214 OFFICE O/P EST MOD 30 MIN: CPT | Mod: 25 | Performed by: FAMILY MEDICINE

## 2020-11-27 RX ORDER — CITALOPRAM HYDROBROMIDE 10 MG/1
10 TABLET ORAL DAILY
Qty: 30 TABLET | Refills: 1 | Status: SHIPPED | OUTPATIENT
Start: 2020-11-27 | End: 2021-01-28

## 2020-11-27 NOTE — PROGRESS NOTES
Subjective     Edvin Tuttle is a 25 year old male who presents to clinic today for the following health issues:    HPI         ED/UC Followup:    Facility:  [facility]  Date of visit: 11/23/2020  Reason for visit: Anxiety attack. Chest pain and high heart rate.  Current Status: Patient states this anxiety attack was caused by a breathing issues that he has had for almost a year. Patient states he is blood pressure has been high. Patient states his chest just feels tight and he feels like he has to think about it to be able to breath. Patient states sometimes it is hard to get a deep breath. He had labs and EKG in the ED, and saw Pulmonary med with the PFT and the CXR. These were all normal.        Current Outpatient Medications   Medication Instructions     azelastine (ASTELIN) 0.1 % nasal spray 2 sprays, Both Nostrils, 2 TIMES DAILY PRN     cetirizine (ZYRTEC) 10 mg, Oral, DAILY     famotidine (PEPCID) 40 mg, Oral, AT BEDTIME     LORazepam (ATIVAN) 0.5-1 mg, Oral, EVERY 8 HOURS PRN     mometasone (NASONEX) 50 MCG/ACT nasal spray 2 sprays, Both Nostrils, DAILY     omeprazole (PRILOSEC) 40 MG DR capsule Take 1 or 2 daily as directed.       Patient Active Problem List   Diagnosis     Pneumonia     Chronic seasonal allergic rhinitis, unspecified trigger     Moderate persistent asthma without complication     MELVIN (generalized anxiety disorder)     Dyspnea, unspecified type     Blood pressure (!) 150/90, pulse 95, temperature 97.5  F (36.4  C), temperature source Tympanic, resp. rate 16, weight 84.1 kg (185 lb 8 oz), SpO2 99 %.    Exam:  GENERAL APPEARANCE: healthy and mild distress  EYES: EOMI,  PERRL  NECK: no adenopathy, no asymmetry, masses, or scars and thyroid normal to palpation  RESP: lungs clear to auscultation - no rales, rhonchi or wheezes  CV: regular rates and rhythm, normal S1 S2, no S3 or S4 and no murmur, click or rub -  MS: extremities normal- no gross deformities noted, no evidence of  inflammation in joints, FROM in all extremities.  SKIN: no suspicious lesions or rashes  PSYCH: mentation appears normal and affect normal/bright      (R06.00) Dyspnea, unspecified type  Comment:   Plan: Symptomatic COVID-19 Virus (Coronavirus) by PCR        We reviewed all the tests and the evaluations for the heart and the lungs. These were normal.     (F41.1) MELVIN (generalized anxiety disorder)  Comment:   Plan: citalopram (CELEXA) 10 MG tablet        It is likely the breathing symptoms are related to the anxiety. Do the Covid test today.   We will call the results. Start Celexa at 10 mg daily. This will take 3-4 days to start working and several weeks for max effect.   Use the Ativan as needed. Recheck with me in one month.     (Z23) Encounter for immunization    Comment:   Plan: TDAP VACCINE (Adacel, Boostrix)  [[id number]]        Done today.       Zach Robison MD

## 2020-11-27 NOTE — PATIENT INSTRUCTIONS
(R06.00) Dyspnea, unspecified type  Comment:   Plan: Symptomatic COVID-19 Virus (Coronavirus) by PCR        We reviewed all the tests and the evaluations for the heart and the lungs. These were normal.     (F41.1) MELVIN (generalized anxiety disorder)  Comment:   Plan: citalopram (CELEXA) 10 MG tablet        It is likely the breathing symptoms are related to the anxiety. Do the Covid test today.   We will call the results. Start Celexa at 10 mg daily. This will take 3-4 days to start working and several weeks for max effect.   Use the Ativan as needed. Recheck with me in one month.     (Z23) Encounter for immunization    Comment:   Plan: TDAP VACCINE (Adacel, Boostrix)  [[id number]]        Done today.

## 2020-12-14 NOTE — PROGRESS NOTES
This laryngoscopy scope was used on this patient.    Flexible    [employer] Flexible #[id number] Adult  Mary Evans CMA       - - -

## 2020-12-15 ENCOUNTER — VIRTUAL VISIT (OUTPATIENT)
Dept: GASTROENTEROLOGY | Facility: CLINIC | Age: 25
End: 2020-12-15
Payer: COMMERCIAL

## 2020-12-15 DIAGNOSIS — R10.13 ABDOMINAL PAIN, EPIGASTRIC: ICD-10-CM

## 2020-12-15 DIAGNOSIS — K21.9 GASTROESOPHAGEAL REFLUX DISEASE, UNSPECIFIED WHETHER ESOPHAGITIS PRESENT: Primary | ICD-10-CM

## 2020-12-15 PROCEDURE — 99243 OFF/OP CNSLTJ NEW/EST LOW 30: CPT | Mod: 95 | Performed by: PHYSICIAN ASSISTANT

## 2020-12-15 NOTE — PROGRESS NOTES
"Edvin Tuttle is a 25 year old male who is being evaluated via a billable video visit.      The patient has been notified of following:     \"This video visit will be conducted via a call between you and your physician/provider. We have found that certain health care needs can be provided without the need for an in-person physical exam.  This service lets us provide the care you need with a video conversation.  If a prescription is necessary we can send it directly to your pharmacy.  If lab work is needed we can place an order for that and you can then stop by our lab to have the test done at a later time.    Video visits are billed at different rates depending on your insurance coverage.  Please reach out to your insurance provider with any questions.    If during the course of the call the physician/provider feels a video visit is not appropriate, you will not be charged for this service.\"    Patient has given verbal consent for Video visit? Yes  How would you like to obtain your AVS? MyChart  If you are dropped from the video visit, the video invite should be resent to: Text to cell phone: [phone]  Will anyone else be joining your video visit? No       America Matthews LPN      Video-Visit Details    Type of service:  Video Visit    Video Start Time: 10:33 AM  Video End Time: 10:45 AM, completed remainder of visit (10:45 AM to 11 AM) through telephone call due to technical difficulties with both gladys and escobar.     Originating Location (pt. Location): Home    Distant Location (provider location):  [facility]     Platform used for Video Visit: Gladys  And Escobar Ortiz PA-C          GASTROENTEROLOGY NEW PATIENT VIDEO/TELEPHONE VISIT         CC/REFERRING MD:    Zach Robison    REASON FOR CONSULTATION:   Referred by Gonzales Tijerina for Consult (Acid Reflux; Epigastric pain occassionally)        HISTORY OF PRESENT ILLNESS:    Edvin Tuttle is 25 " year old male who presents for evaluation of epigastric abd pain and reflux. Symptoms initially started in January 2020 with acute illness. He believes he either had influenza or may have had COVID. Since then he notes troubles with his health. He had concerns with throat fullness, shortness of breath and chest pressure.  Work up includes CXR, PFT and EKG. He was more recently evaluated by an ENT which included eval with laryngoscope and esophagram with video swallow study. Findings were unrevealing. symptomsm thought to be related to reflux and he was therefore referred to GI for further evaluation. He notes an epigastric abd pain that is worse with eating.  Doesn't seem to matter which foods however he admits to having diet high in acidic content (caffeine, spicy foods, occ alcohol).He has a stomach grumbling sensation and sensation of feeling hungry. He has noted burping. Burping however feels different than before. Doesn't always feel very easy to burp. He does have some acid reflux and has been on omeprazole 40mg for about 3 months now. He does note some improvement in symptoms with medication. He is also taking pepcid as needed at bedtime but hasn't noticed any significant change with that.     He denies any n/v. No dysphagia. He has noted some diarrhea, but appeared to be related to alcohol (occurring the day after alcohol). Otherwise denies bowel changes/concerns.     He does feel that anxiety may have been a factor with his symptoms as well. Recent treatment with celexa has made a significant impact.     He denies smoking. Denies regular or frequent use of NSAID's.       PROBLEM LIST  Patient Active Problem List    Diagnosis Date Noted     MELVIN (generalized anxiety disorder) 11/27/2020     Priority: Medium     Dyspnea, unspecified type 11/27/2020     Priority: Medium     Moderate persistent asthma without complication 06/24/2020     Priority: Medium     Chronic seasonal allergic rhinitis, unspecified trigger  10/16/2017     Priority: Medium     Pneumonia 07/07/2010     Priority: Medium       PERTINENT PAST MEDICAL HISTORY:  (I personally reviewed this history with the patient at today's visit)   No past medical history on file.      PREVIOUS SURGERIES: (I personally reviewed this history with the patient at today's visit)   No past surgical history on file.      ALLERGIES:   No Known Allergies    PERTINENT MEDICATIONS:    Current Outpatient Medications:      cetirizine (ZYRTEC) 10 MG tablet, Take 10 mg by mouth daily, Disp: , Rfl:      citalopram (CELEXA) 10 MG tablet, Take 1 tablet (10 mg) by mouth daily, Disp: 30 tablet, Rfl: 1     famotidine (PEPCID) 40 MG tablet, Take 1 tablet (40 mg) by mouth At Bedtime (Patient taking differently: Take 40 mg by mouth nightly as needed ), Disp: 90 tablet, Rfl: 1     LORazepam (ATIVAN) 1 MG tablet, Take 0.5-1 tablets (0.5-1 mg) by mouth every 8 hours as needed for anxiety (or insomnia), Disp: 12 tablet, Rfl: 0     mometasone (NASONEX) 50 MCG/ACT nasal spray, Spray 2 sprays into both nostrils daily (Patient taking differently: Spray 2 sprays into both nostrils daily as needed ), Disp: 17 g, Rfl: 3     omeprazole (PRILOSEC) 40 MG DR capsule, Take 1 or 2 daily as directed. (Patient taking differently: Take 40 mg by mouth daily ), Disp: 60 capsule, Rfl: 3     azelastine (ASTELIN) 0.1 % nasal spray, Spray 2 sprays into both nostrils 2 times daily as needed for rhinitis (Patient not taking: Reported on 12/15/2020), Disp: 30 mL, Rfl: 3    SOCIAL HISTORY:  Social History     Socioeconomic History     Marital status: Single     Spouse name: Not on file     Number of children: Not on file     Years of education: Not on file     Highest education level: Not on file   Occupational History     Not on file   Social Needs     Financial resource strain: Not on file     Food insecurity     Worry: Not on file     Inability: Not on file     Transportation needs     Medical: Not on file     Non-medical:  Not on file   Tobacco Use     Smoking status: Never Smoker     Smokeless tobacco: Never Used     Tobacco comment: Parents smoke outside.   Substance and Sexual Activity     Alcohol use: Yes     Comment: 2 Beers daily.     Drug use: No     Sexual activity: Never   Lifestyle     Physical activity     Days per week: Not on file     Minutes per session: Not on file     Stress: Not on file   Relationships     Social connections     Talks on phone: Not on file     Gets together: Not on file     Attends Hinduism service: Not on file     Active member of club or organization: Not on file     Attends meetings of clubs or organizations: Not on file     Relationship status: Not on file     Intimate partner violence     Fear of current or ex partner: Not on file     Emotionally abused: Not on file     Physically abused: Not on file     Forced sexual activity: Not on file   Other Topics Concern     Parent/sibling w/ CABG, MI or angioplasty before 65F 55M? Not Asked   Social History Narrative    June 26, 2020    ENVIRONMENTAL HISTORY: [place] The home is heated with a forced air and gas furnace. They do have central air conditioning. The patient's bedroom is furnished with carpeting in bedroom and animals sleep in bedroom.  Pets inside the house include 1 dog. There is history of occasional mice. There are no smokers in the house.  The house does not have a damp basement.        FAMILY HISTORY: (I personally reviewed this history with the patient at today's visit)  Family History   Problem Relation Age of Onset     Musculoskeletal Disorder Father         ALS     Allergies Father      Alzheimer Disease Maternal Grandmother      Cancer Maternal Grandfather         lung     Cancer Paternal Grandfather         Lung     Other - See Comments Mother 65        throat cancer     Asthma Paternal Uncle          ROS: 10 point ROS neg other than the symptoms noted above in the  HPI.      PHYSICAL EXAMINATION:  Constitutional: aaox3, cooperative, pleasant, not dyspneic/diaphoretic, no acute distress      GENERAL: Healthy, alert and no distress  EYES: Eyes grossly normal to inspection.  No discharge or erythema, or obvious scleral/conjunctival abnormalities.  RESP: No audible wheeze, cough, or visible cyanosis.  No visible retractions or increased work of breathing.    SKIN: Visible skin clear. No significant rash, abnormal pigmentation or lesions.  NEURO: Cranial nerves grossly intact.  Mentation and speech appropriate for age.  PSYCH: Mentation appears normal, affect normal/bright, judgement and insight intact, normal speech and appearance well-groomed.          PERTINENT STUDIES: (I personally reviewed these laboratory studies today)  Most recent CBC:   Recent Labs   Lab Test 11/23/20  1341   WBC 8.8   HGB 14.9   HCT 42.6        Most recent creatinine:  Recent Labs   Lab Test 11/23/20  1341   CR 0.94       TSH   Date Value Ref Range Status   11/23/2020 1.06 0.40 - 4.00 mU/L Final           ASSESSMENT/PLAN:    Edvin Tuttle is a 25 year old male who presents for GI concerns. Symptoms first started in January after an acute illness which he believes was either influenza or COVID. He actually had multiple health concerns including shortness of breath, chest pressure and throat fullness. Work up over the last several months includes cxr, PFT and EKG. More recently he has been evaluated by an ENT for his symptoms. Work up included laryngoscope and video swallow study which were unremarkable. He overall actually finds that his symptoms have significantly improved since treatment of anxiety with celexa. He still has some GI symptoms of epigastric pain and reflux. He is on omeprazole 40mg daily and taking pepcid as needed. Advised to continue. Recommended avoiding or limiting acidic content. Can continue to monitor at this time as he has noted improvement in his symptoms and overall  health. Recent esophagram was also normal which is reassuring. Can consider an EGD if any symptoms persist or worsen. He agrees with this plan.       Follow up in 2 months to check progress, sooner if needed.     Gastroesophageal reflux disease, unspecified whether esophagitis present  Abdominal pain, epigastric      Thank you for this consultation.  It was a pleasure to participate in the care of this patient; please contact us with any further questions.      Víctor Ortiz PA-C  Gastroenterology  [facility]

## 2020-12-15 NOTE — PATIENT INSTRUCTIONS
Continue omeprazole 40mg. This should be taken every morning on an empty stomach about 30 minutes before breakfast.     Continue taking pepcid (famoditine) as needed. You can take this with or without food. If you notice that you are more symptomatic near dinner time, then I would try taking this with dinner.     Follow up in 2 months, sooner if needed.

## 2021-01-27 DIAGNOSIS — F41.1 GAD (GENERALIZED ANXIETY DISORDER): ICD-10-CM

## 2021-01-27 NOTE — TELEPHONE ENCOUNTER
"Requested Prescriptions   Pending Prescriptions Disp Refills     citalopram (CELEXA) 10 MG tablet 30 tablet 1     Sig: Take 1 tablet (10 mg) by mouth daily       SSRIs Protocol Passed - 1/27/2021  8:36 AM        Passed - Recent (12 mo) or future (30 days) visit within the authorizing provider's specialty     Patient has had an office visit with the authorizing provider or a provider within the authorizing providers department within the previous 12 mos or has a future within next 30 days. See \"Patient Info\" tab in inbasket, or \"Choose Columns\" in Meds & Orders section of the refill encounter.              Passed - Medication is active on med list        Passed - Patient is age 18 or older             "

## 2021-01-28 RX ORDER — CITALOPRAM HYDROBROMIDE 10 MG/1
10 TABLET ORAL DAILY
Qty: 30 TABLET | Refills: 1 | Status: SHIPPED | OUTPATIENT
Start: 2021-01-28 | End: 2021-03-30

## 2021-03-30 DIAGNOSIS — F41.1 GAD (GENERALIZED ANXIETY DISORDER): ICD-10-CM

## 2021-03-30 RX ORDER — CITALOPRAM HYDROBROMIDE 10 MG/1
TABLET ORAL
Qty: 90 TABLET | Refills: 1 | Status: SHIPPED | OUTPATIENT
Start: 2021-03-30 | End: 2021-10-20

## 2021-03-30 NOTE — TELEPHONE ENCOUNTER
"Requested Prescriptions   Pending Prescriptions Disp Refills     citalopram (CELEXA) 10 MG tablet [Pharmacy Med Name: CITALOPRAM 10MG TABLETS] 30 tablet 1     Sig: TAKE 1 TABLET(10 MG) BY MOUTH DAILY       SSRIs Protocol Passed - 3/30/2021  3:52 AM        Passed - Recent (12 mo) or future (30 days) visit within the authorizing provider's specialty     Patient has had an office visit with the authorizing provider or a provider within the authorizing providers department within the previous 12 mos or has a future within next 30 days. See \"Patient Info\" tab in inbasket, or \"Choose Columns\" in Meds & Orders section of the refill encounter.              Passed - Medication is active on med list        Passed - Patient is age 18 or older             "

## 2021-03-30 NOTE — TELEPHONE ENCOUNTER
"Requested Prescriptions   Pending Prescriptions Disp Refills     citalopram (CELEXA) 10 MG tablet [Pharmacy Med Name: CITALOPRAM 10MG TABLETS] 30 tablet 1     Sig: TAKE 1 TABLET(10 MG) BY MOUTH DAILY       SSRIs Protocol Passed - 3/30/2021 10:15 AM        Passed - Recent (12 mo) or future (30 days) visit within the authorizing provider's specialty     Patient has had an office visit with the authorizing provider or a provider within the authorizing providers department within the previous 12 mos or has a future within next 30 days. See \"Patient Info\" tab in inbasket, or \"Choose Columns\" in Meds & Orders section of the refill encounter.              Passed - Medication is active on med list        Passed - Patient is age 18 or older             "

## 2021-07-12 DIAGNOSIS — K21.9 GASTROESOPHAGEAL REFLUX DISEASE WITHOUT ESOPHAGITIS: ICD-10-CM

## 2021-07-14 RX ORDER — OMEPRAZOLE 40 MG/1
CAPSULE, DELAYED RELEASE ORAL
Qty: 60 CAPSULE | Refills: 3 | Status: SHIPPED | OUTPATIENT
Start: 2021-07-14 | End: 2022-06-13

## 2021-09-18 ENCOUNTER — HEALTH MAINTENANCE LETTER (OUTPATIENT)
Age: 26
End: 2021-09-18

## 2021-11-26 DIAGNOSIS — F41.1 GAD (GENERALIZED ANXIETY DISORDER): ICD-10-CM

## 2021-11-29 RX ORDER — CITALOPRAM HYDROBROMIDE 10 MG/1
TABLET ORAL
Qty: 30 TABLET | Refills: 0 | Status: SHIPPED | OUTPATIENT
Start: 2021-11-29 | End: 2022-03-02

## 2021-11-30 DIAGNOSIS — F41.1 GAD (GENERALIZED ANXIETY DISORDER): ICD-10-CM

## 2021-12-03 RX ORDER — CITALOPRAM HYDROBROMIDE 10 MG/1
10 TABLET ORAL DAILY
Qty: 30 TABLET | Refills: 0 | OUTPATIENT
Start: 2021-12-03

## 2021-12-03 NOTE — TELEPHONE ENCOUNTER
Routing refill request to provider for review/approval because:  PCP retired    Pending Prescriptions:                       Disp   Refills    citalopram (CELEXA) 10 MG tablet           30 tab*0        Sig: Take 1 tablet (10 mg) by mouth daily    Valencia Light RN on 12/3/2021 at 1:02 PM

## 2021-12-20 ENCOUNTER — IMMUNIZATION (OUTPATIENT)
Dept: NURSING | Facility: CLINIC | Age: 26
End: 2021-12-20
Payer: COMMERCIAL

## 2021-12-20 PROCEDURE — 91300 PR COVID VAC PFIZER DIL RECON 30 MCG/0.3 ML IM: CPT

## 2021-12-20 PROCEDURE — 0004A PR COVID VAC PFIZER DIL RECON 30 MCG/0.3 ML IM: CPT

## 2022-01-08 ENCOUNTER — HEALTH MAINTENANCE LETTER (OUTPATIENT)
Age: 27
End: 2022-01-08

## 2022-03-18 ENCOUNTER — OFFICE VISIT (OUTPATIENT)
Dept: FAMILY MEDICINE | Facility: CLINIC | Age: 27
End: 2022-03-18
Payer: COMMERCIAL

## 2022-03-18 VITALS
WEIGHT: 222 LBS | RESPIRATION RATE: 16 BRPM | OXYGEN SATURATION: 98 % | SYSTOLIC BLOOD PRESSURE: 138 MMHG | HEIGHT: 73 IN | HEART RATE: 109 BPM | DIASTOLIC BLOOD PRESSURE: 80 MMHG | BODY MASS INDEX: 29.42 KG/M2 | TEMPERATURE: 98.6 F

## 2022-03-18 DIAGNOSIS — F41.1 GAD (GENERALIZED ANXIETY DISORDER): ICD-10-CM

## 2022-03-18 DIAGNOSIS — Z00.00 ROUTINE GENERAL MEDICAL EXAMINATION AT A HEALTH CARE FACILITY: Primary | ICD-10-CM

## 2022-03-18 PROCEDURE — 99395 PREV VISIT EST AGE 18-39: CPT | Performed by: NURSE PRACTITIONER

## 2022-03-18 PROCEDURE — 99214 OFFICE O/P EST MOD 30 MIN: CPT | Mod: 25 | Performed by: NURSE PRACTITIONER

## 2022-03-18 RX ORDER — CITALOPRAM HYDROBROMIDE 10 MG/1
10 TABLET ORAL DAILY
Qty: 30 TABLET | Refills: 1 | Status: CANCELLED | OUTPATIENT
Start: 2022-03-18

## 2022-03-18 RX ORDER — CITALOPRAM HYDROBROMIDE 20 MG/1
20 TABLET ORAL DAILY
Qty: 90 TABLET | Refills: 3 | Status: SHIPPED | OUTPATIENT
Start: 2022-03-18

## 2022-03-18 ASSESSMENT — ENCOUNTER SYMPTOMS
HEADACHES: 0
HEMATOCHEZIA: 0
EYE PAIN: 0
PALPITATIONS: 1
MYALGIAS: 0
ARTHRALGIAS: 0
DIZZINESS: 0
JOINT SWELLING: 0
CHILLS: 0
SHORTNESS OF BREATH: 1
ABDOMINAL PAIN: 0
FREQUENCY: 0
COUGH: 0
DYSURIA: 0
SORE THROAT: 0
WEAKNESS: 0
HEARTBURN: 0
PARESTHESIAS: 0
CONSTIPATION: 0
NAUSEA: 0
DIARRHEA: 0
FEVER: 0
HEMATURIA: 0
NERVOUS/ANXIOUS: 0

## 2022-03-18 ASSESSMENT — ANXIETY QUESTIONNAIRES
5. BEING SO RESTLESS THAT IT IS HARD TO SIT STILL: NOT AT ALL
2. NOT BEING ABLE TO STOP OR CONTROL WORRYING: NOT AT ALL
3. WORRYING TOO MUCH ABOUT DIFFERENT THINGS: NOT AT ALL
6. BECOMING EASILY ANNOYED OR IRRITABLE: NOT AT ALL
7. FEELING AFRAID AS IF SOMETHING AWFUL MIGHT HAPPEN: NOT AT ALL
GAD7 TOTAL SCORE: 2
1. FEELING NERVOUS, ANXIOUS, OR ON EDGE: SEVERAL DAYS
4. TROUBLE RELAXING: SEVERAL DAYS
7. FEELING AFRAID AS IF SOMETHING AWFUL MIGHT HAPPEN: NOT AT ALL
GAD7 TOTAL SCORE: 2

## 2022-03-18 NOTE — PROGRESS NOTES
"SUBJECTIVE:   CC: Edvin Tuttle is an 26 year old male who presents for preventative health visit.       Patient has been advised of split billing requirements and indicates understanding: Yes  Healthy Habits:     Getting at least 3 servings of Calcium per day:  NO    Bi-annual eye exam:  Yes    Dental care twice a year:  NO    Sleep apnea or symptoms of sleep apnea:  Daytime drowsiness and Excessive snoring    Diet:  Regular (no restrictions)    Frequency of exercise:  1 day/week    Duration of exercise:  15-30 minutes    Taking medications regularly:  Yes    Medication side effects:  None    PHQ-2 Total Score: 2    Additional concerns today:  Yes        PROBLEMS TO ADD ON...  Anxiety Follow-Up    How are you doing with your anxiety since your last visit? \"all right\" on medication    Are you having other symptoms that might be associated with anxiety? No    Have you had a significant life event? Health Concerns - started when he had Covid early on in the pandemic,  Continues to have breathing issues- sob; heart pounding; thinks he has some long Covid issues    Are you feeling depressed? Yes:  a little here and there    Do you have any concerns with your use of alcohol or other drugs? Yes:  admits to drinking too much     Citalopram 10 mg daily helps to take the edge off.    Social History     Tobacco Use     Smoking status: Never Smoker     Smokeless tobacco: Never Used     Tobacco comment: occassional cigar   Vaping Use     Vaping Use: Never used   Substance Use Topics     Alcohol use: Yes     Comment: 2 Beers daily.     Drug use: Not Currently     Types: Marijuana     Comment: socially     MELVIN-7 SCORE 3/18/2022   Total Score 2 (minimal anxiety)   Total Score 2     No flowsheet data found.        Today's PHQ-2 Score:   PHQ-2 ( 1999 Pfizer) 3/18/2022   Q1: Little interest or pleasure in doing things 1   Q2: Feeling down, depressed or hopeless 1   PHQ-2 Score 2   PHQ-2 Total Score (12-17 Years)- Positive if 3 or " more points; Administer PHQ-A if positive -   Q1: Little interest or pleasure in doing things Several days   Q2: Feeling down, depressed or hopeless Several days   PHQ-2 Score 2       Abuse: Current or Past(Physical, Sexual or Emotional)- No  Do you feel safe in your environment? Yes    Have you ever done Advance Care Planning? (For example, a Health Directive, POLST, or a discussion with a medical provider or your loved ones about your wishes): No, advance care planning information given to patient to review.  Advanced care planning was discussed at today's visit.    Social History     Tobacco Use     Smoking status: Never Smoker     Smokeless tobacco: Never Used     Tobacco comment: occassional cigar   Substance Use Topics     Alcohol use: Yes     Comment: 2 Beers daily.         Alcohol Use 3/18/2022   Prescreen: >3 drinks/day or >7 drinks/week? Yes   AUDIT SCORE  9       Last PSA: No results found for: PSA    Reviewed orders with patient. Reviewed health maintenance and updated orders accordingly - Yes      Reviewed and updated as needed this visit by clinical staff   Tobacco  Allergies  Meds   Med Hx  Surg Hx  Fam Hx  Soc Hx        Reviewed and updated as needed this visit by Provider                     Review of Systems   Constitutional: Negative for chills and fever.   HENT: Positive for congestion. Negative for ear pain, hearing loss and sore throat.    Eyes: Negative for pain and visual disturbance.   Respiratory: Positive for shortness of breath. Negative for cough.    Cardiovascular: Positive for palpitations. Negative for chest pain and peripheral edema.   Gastrointestinal: Negative for abdominal pain, constipation, diarrhea, heartburn, hematochezia and nausea.   Genitourinary: Negative for dysuria, frequency, genital sores, hematuria, impotence, penile discharge and urgency.   Musculoskeletal: Negative for arthralgias, joint swelling and myalgias.   Skin: Negative for rash.   Neurological:  "Negative for dizziness, weakness, headaches and paresthesias.   Psychiatric/Behavioral: Negative for mood changes. The patient is not nervous/anxious.          OBJECTIVE:   /80 (BP Location: Right arm, Cuff Size: Adult Large)   Pulse 109   Temp 98.6  F (37  C) (Tympanic)   Resp 16   Ht 1.842 m (6' 0.5\")   Wt 100.7 kg (222 lb)   SpO2 98%   BMI 29.69 kg/m      Physical Exam  GENERAL: healthy, alert and no distress  EYES: Eyes grossly normal to inspection, PERRL and conjunctivae and sclerae normal  HENT: ear canals and TM's normal, nose and mouth without ulcers or lesions  NECK: no adenopathy, no asymmetry, masses, or scars and thyroid normal to palpation  RESP: lungs clear to auscultation - no rales, rhonchi or wheezes  CV: regular rate and rhythm, normal S1 S2, no S3 or S4, no murmur, click or rub, no peripheral edema and peripheral pulses strong  ABDOMEN: soft, nontender, no hepatosplenomegaly, no masses and bowel sounds normal  MS: no gross musculoskeletal defects noted, no edema  SKIN: no suspicious lesions or rashes  NEURO: Normal strength and tone, mentation intact and speech normal  PSYCH: mentation appears normal, affect normal/bright        ASSESSMENT/PLAN:       ICD-10-CM    1. Routine general medical examination at a health care facility    Z00.00    2. MELVIN (generalized anxiety disorder)  F41.1 Poorly controlled.  Increase citalopram to 20 mg daily.  Follow up in one month if no improvement.  citalopram (CELEXA) 20 MG tablet       Patient has been advised of split billing requirements and indicates understanding: Yes by AFR and CMA    COUNSELING:   Reviewed preventive health counseling, as reflected in patient instructions    Estimated body mass index is 29.69 kg/m  as calculated from the following:    Height as of this encounter: 1.842 m (6' 0.5\").    Weight as of this encounter: 100.7 kg (222 lb).     Weight management plan: Discussed healthy diet and exercise guidelines    He reports that he " has never smoked. He has never used smokeless tobacco.      The risks, benefits and treatment options of prescribed medications or other treatments have been discussed with the patient. The patient verbalized their understanding and should call or follow up if no improvement or if they develop further problems.    MANDY Castillo [facility]

## 2022-03-19 ASSESSMENT — ANXIETY QUESTIONNAIRES: GAD7 TOTAL SCORE: 2

## 2022-11-19 ENCOUNTER — HEALTH MAINTENANCE LETTER (OUTPATIENT)
Age: 27
End: 2022-11-19

## 2023-03-30 ENCOUNTER — OFFICE VISIT (OUTPATIENT)
Dept: FAMILY MEDICINE | Facility: CLINIC | Age: 28
End: 2023-03-30
Payer: COMMERCIAL

## 2023-03-30 VITALS
HEART RATE: 82 BPM | TEMPERATURE: 98.2 F | BODY MASS INDEX: 28.68 KG/M2 | HEIGHT: 74 IN | SYSTOLIC BLOOD PRESSURE: 130 MMHG | RESPIRATION RATE: 16 BRPM | WEIGHT: 223.5 LBS | OXYGEN SATURATION: 98 % | DIASTOLIC BLOOD PRESSURE: 78 MMHG

## 2023-03-30 DIAGNOSIS — M25.512 CHRONIC PAIN OF BOTH SHOULDERS: ICD-10-CM

## 2023-03-30 DIAGNOSIS — Z00.00 ROUTINE GENERAL MEDICAL EXAMINATION AT A HEALTH CARE FACILITY: Primary | ICD-10-CM

## 2023-03-30 DIAGNOSIS — R40.0 DAYTIME SLEEPINESS: ICD-10-CM

## 2023-03-30 DIAGNOSIS — R06.2 WHEEZING: ICD-10-CM

## 2023-03-30 DIAGNOSIS — G89.29 CHRONIC PAIN OF BOTH SHOULDERS: ICD-10-CM

## 2023-03-30 DIAGNOSIS — Z11.59 NEED FOR HEPATITIS C SCREENING TEST: ICD-10-CM

## 2023-03-30 DIAGNOSIS — Z23 HIGH PRIORITY FOR 2019-NCOV VACCINE: ICD-10-CM

## 2023-03-30 DIAGNOSIS — M25.511 CHRONIC PAIN OF BOTH SHOULDERS: ICD-10-CM

## 2023-03-30 DIAGNOSIS — Z11.4 SCREENING FOR HIV (HUMAN IMMUNODEFICIENCY VIRUS): ICD-10-CM

## 2023-03-30 LAB
DEPRECATED CALCIDIOL+CALCIFEROL SERPL-MC: 18 UG/L (ref 20–75)
HCV AB SERPL QL IA: NONREACTIVE
HIV 1+2 AB+HIV1 P24 AG SERPL QL IA: NONREACTIVE

## 2023-03-30 PROCEDURE — 82306 VITAMIN D 25 HYDROXY: CPT | Performed by: STUDENT IN AN ORGANIZED HEALTH CARE EDUCATION/TRAINING PROGRAM

## 2023-03-30 PROCEDURE — 99213 OFFICE O/P EST LOW 20 MIN: CPT | Mod: 25 | Performed by: STUDENT IN AN ORGANIZED HEALTH CARE EDUCATION/TRAINING PROGRAM

## 2023-03-30 PROCEDURE — 87389 HIV-1 AG W/HIV-1&-2 AB AG IA: CPT | Performed by: STUDENT IN AN ORGANIZED HEALTH CARE EDUCATION/TRAINING PROGRAM

## 2023-03-30 PROCEDURE — 99395 PREV VISIT EST AGE 18-39: CPT | Mod: 25 | Performed by: STUDENT IN AN ORGANIZED HEALTH CARE EDUCATION/TRAINING PROGRAM

## 2023-03-30 PROCEDURE — 36415 COLL VENOUS BLD VENIPUNCTURE: CPT | Performed by: STUDENT IN AN ORGANIZED HEALTH CARE EDUCATION/TRAINING PROGRAM

## 2023-03-30 PROCEDURE — 91313 COVID-19 VACCINE BIVALENT BOOSTER 18+ (MODERNA): CPT | Performed by: STUDENT IN AN ORGANIZED HEALTH CARE EDUCATION/TRAINING PROGRAM

## 2023-03-30 PROCEDURE — 86803 HEPATITIS C AB TEST: CPT | Performed by: STUDENT IN AN ORGANIZED HEALTH CARE EDUCATION/TRAINING PROGRAM

## 2023-03-30 PROCEDURE — 0134A COVID-19 VACCINE BIVALENT BOOSTER 18+ (MODERNA): CPT | Performed by: STUDENT IN AN ORGANIZED HEALTH CARE EDUCATION/TRAINING PROGRAM

## 2023-03-30 RX ORDER — ALBUTEROL SULFATE 90 UG/1
2 AEROSOL, METERED RESPIRATORY (INHALATION) EVERY 6 HOURS PRN
Qty: 18 G | Refills: 0 | Status: SHIPPED | OUTPATIENT
Start: 2023-03-30

## 2023-03-30 ASSESSMENT — ENCOUNTER SYMPTOMS
JOINT SWELLING: 0
ABDOMINAL PAIN: 0
PARESTHESIAS: 0
HEMATOCHEZIA: 0
WEAKNESS: 0
MYALGIAS: 0
SHORTNESS OF BREATH: 1
HEADACHES: 0
DIARRHEA: 0
ARTHRALGIAS: 0
EYE PAIN: 0
HEARTBURN: 0
CONSTIPATION: 0
CHILLS: 0
DYSURIA: 0
SORE THROAT: 0
COUGH: 0
DIZZINESS: 0
HEMATURIA: 0
FREQUENCY: 0
NAUSEA: 0
FEVER: 0
NERVOUS/ANXIOUS: 0
PALPITATIONS: 0

## 2023-03-30 ASSESSMENT — ASTHMA QUESTIONNAIRES
QUESTION_2 LAST FOUR WEEKS HOW OFTEN HAVE YOU HAD SHORTNESS OF BREATH: NOT AT ALL
ACT_TOTALSCORE: 25
QUESTION_4 LAST FOUR WEEKS HOW OFTEN HAVE YOU USED YOUR RESCUE INHALER OR NEBULIZER MEDICATION (SUCH AS ALBUTEROL): NOT AT ALL
QUESTION_1 LAST FOUR WEEKS HOW MUCH OF THE TIME DID YOUR ASTHMA KEEP YOU FROM GETTING AS MUCH DONE AT WORK, SCHOOL OR AT HOME: NONE OF THE TIME
ACT_TOTALSCORE: 25
QUESTION_3 LAST FOUR WEEKS HOW OFTEN DID YOUR ASTHMA SYMPTOMS (WHEEZING, COUGHING, SHORTNESS OF BREATH, CHEST TIGHTNESS OR PAIN) WAKE YOU UP AT NIGHT OR EARLIER THAN USUAL IN THE MORNING: NOT AT ALL
QUESTION_5 LAST FOUR WEEKS HOW WOULD YOU RATE YOUR ASTHMA CONTROL: COMPLETELY CONTROLLED

## 2023-03-30 ASSESSMENT — PAIN SCALES - GENERAL: PAINLEVEL: MILD PAIN (2)

## 2023-03-30 NOTE — PROGRESS NOTES
pSUBJECTIVE:   CC: Edvin is an 27 year old who presents for preventative health visit.   Additional Questions 3/30/2023   Roomed by Olivia SPENCE LPN   Accompanied by self     Patient Reported Additional Medications 3/30/2023   Patient reports taking the following new medications no new meds     Patient has been advised of split billing requirements and indicates understanding: Yes  Healthy Habits:     Getting at least 3 servings of Calcium per day:  Yes    Bi-annual eye exam:  Yes    Dental care twice a year:  NO    Sleep apnea or symptoms of sleep apnea:  Daytime drowsiness    Diet:  Regular (no restrictions)    Frequency of exercise:  1 day/week    Duration of exercise:  15-30 minutes    Taking medications regularly:  Yes    Barriers to taking medications:  None    Medication side effects:  None    PHQ-2 Total Score: 0    Additional concerns today:  Yes  Shoulder Pain  Pertinent negatives include no abdominal pain, arthralgias, chest pain, chills, congestion, coughing, fever, headaches, joint swelling, myalgias, nausea, rash, sore throat or weakness.       Joint Pain    Onset: a few years his right shoulder has been bothering him but recently his left as been as well. Has been having less range of motion than in the past.    Description:   Location: left shoulder and right shoulder  Character: Dull ache    Intensity: mild currently 2/10 on pain scale, at it's worst it gets up to about 3/10    Progression of Symptoms: same    Accompanying Signs & Symptoms:  Other symptoms: none    History:   Previous similar pain: YES- right has been bothering him for awhile      Precipitating factors:   Trauma or overuse: no     Alleviating factors:  Improved by: nothing, hasn't really tried anything  Therapies Tried and outcome: none    Today's PHQ-2 Score:   PHQ-2 ( 1999 Pfizer) 3/30/2023   Q1: Little interest or pleasure in doing things 0   Q2: Feeling down, depressed or hopeless 0   PHQ-2 Score 0   PHQ-2 Total Score (12-17  Years)- Positive if 3 or more points; Administer PHQ-A if positive -   Q1: Little interest or pleasure in doing things Not at all   Q2: Feeling down, depressed or hopeless Not at all   PHQ-2 Score 0       Social History     Tobacco Use     Smoking status: Some Days     Packs/day: 0.10     Years: 1.00     Pack years: 0.10     Types: Cigarettes     Start date: 10/1/2021     Smokeless tobacco: Current     Tobacco comments:     Brief. Don't smoke very much.   Substance Use Topics     Alcohol use: Yes     Comment: I know I drink too much. Currently working on it.         Alcohol Use 3/30/2023   Prescreen: >3 drinks/day or >7 drinks/week? Yes   AUDIT SCORE  9     AUDIT - Alcohol Use Disorders Identification Test - Reproduced from the World Health Organization Audit 2001 (Second Edition) 3/30/2023   1.  How often do you have a drink containing alcohol? 4 or more times a week   2.  How many drinks containing alcohol do you have on a typical day when you are drinking? 3 or 4   3.  How often do you have five or more drinks on one occasion? Weekly   4.  How often during the last year have you found that you were not able to stop drinking once you had started? Never   5.  How often during the last year have you failed to do what was normally expected of you because of drinking? Never   6.  How often during the last year have you needed a first drink in the morning to get yourself going after a heavy drinking session? Never   7.  How often during the last year have you had a feeling of guilt or remorse after drinking? Never   8.  How often during the last year have you been unable to remember what happened the night before because of your drinking? Less than monthly   9.  Have you or someone else been injured because of your drinking? No   10. Has a relative, friend, doctor or other health care worker been concerned about your drinking or suggested you cut down? No   TOTAL SCORE 9       Last PSA: No results found for:  "PSA    Reviewed orders with patient. Reviewed health maintenance and updated orders accordingly - Yes  Lab work is in process    Reviewed and updated as needed this visit by clinical staff   Tobacco  Allergies  Meds              Reviewed and updated as needed this visit by Provider                   Review of Systems   Constitutional: Negative for chills and fever.   HENT: Negative for congestion, ear pain, hearing loss and sore throat.    Eyes: Negative for pain and visual disturbance.   Respiratory: Positive for shortness of breath. Negative for cough.    Cardiovascular: Negative for chest pain, palpitations and peripheral edema.   Gastrointestinal: Negative for abdominal pain, constipation, diarrhea, heartburn, hematochezia and nausea.   Genitourinary: Negative for dysuria, frequency, genital sores, hematuria, impotence, penile discharge and urgency.   Musculoskeletal: Negative for arthralgias, joint swelling and myalgias.   Skin: Negative for rash.   Neurological: Negative for dizziness, weakness, headaches and paresthesias.   Psychiatric/Behavioral: Negative for mood changes. The patient is not nervous/anxious.        OBJECTIVE:   /78 (BP Location: Left arm, Patient Position: Sitting, Cuff Size: Adult Regular)   Pulse 82   Temp 98.2  F (36.8  C) (Tympanic)   Resp 16   Ht 1.87 m (6' 1.62\")   Wt 101.4 kg (223 lb 8 oz)   SpO2 98%   BMI 28.99 kg/m      Physical Exam  Constitutional:       General: He is not in acute distress.  HENT:      Head: Normocephalic and atraumatic.      Right Ear: External ear normal.      Left Ear: External ear normal.      Mouth/Throat:      Pharynx: No posterior oropharyngeal erythema.   Eyes:      Extraocular Movements: Extraocular movements intact.   Cardiovascular:      Rate and Rhythm: Normal rate and regular rhythm.      Heart sounds: Normal heart sounds.   Pulmonary:      Effort: Pulmonary effort is normal. No respiratory distress.      Breath sounds: No stridor. " Wheezing present. No rhonchi or rales.      Comments: Some mild wheezing in lower lung bases   Abdominal:      Palpations: Abdomen is soft. There is no mass.      Tenderness: There is no abdominal tenderness.   Musculoskeletal:         General: No deformity.      Cervical back: Normal range of motion and neck supple.      Comments: Patient had difficulty with having his right arm reach his back more than his left   Left arm would make popping sound when he would stretch it posteriorly    Skin:     General: Skin is warm.      Findings: No rash.   Neurological:      General: No focal deficit present.      Mental Status: He is alert and oriented to person, place, and time.   Psychiatric:         Mood and Affect: Mood normal.       ASSESSMENT/PLAN:     1. Routine general medical examination at a health care facility  2. Screening for HIV (human immunodeficiency virus)  - HIV Antigen Antibody Combo; Future  - HIV Antigen Antibody Combo    3. Need for hepatitis C screening test  - Hepatitis C Screen Reflex to HCV RNA Quant and Genotype; Future  - Hepatitis C Screen Reflex to HCV RNA Quant and Genotype    4. High priority for 2019-nCoV vaccine  - administered today in clinic     5. Chronic pain of both shoulders  > patient used to work at [employer] and would lift heavy boxes   - Physical Therapy Referral; Future  - Vitamin D Deficiency    6. Daytime sleepiness  - Adult Sleep Eval & Management  Referral; Future    7. Wheezing  > patient reports he was told he does not have asthma because of a normal PFT in the past   - given wheezing during physical exam, will send prescription for albuterol (PROAIR HFA/PROVENTIL HFA/VENTOLIN HFA) 108 (90 Base) MCG/ACT inhaler; Inhale 2 puffs into the lungs every 6 hours as needed for shortness of breath, wheezing or cough  Dispense: 18 g; Refill: 0      Patient has been advised of split billing requirements and indicates understanding: Yes      COUNSELING:   Reviewed preventive health  counseling, as reflected in patient instructions        He reports that he has been smoking cigarettes. He started smoking about 17 months ago. He has a 0.10 pack-year smoking history. He uses smokeless tobacco.  Nicotine/Tobacco Cessation Plan:   pt reports he doens't smoke much and doesn't intend on smoking long term, reports he doesn't think he needs help with quitting at this time            FIFI GAITAN MD  [facility]

## 2023-03-30 NOTE — LETTER
March 31, 2023      Edvin Tuttle  [id number] [place] [address]        Dear ,    We are writing to inform you of your test results.    It is a pleasure providing you with medical care. I have received and reviewed your lab results, and have the following recommendations:     You have mildly low Vitamin D levels. Normally I would send a prescription for Vit D supplements to be taking for 6-8 weeks. However, I know you recently started taking multivitamins so it is unclear if this will be necessary at this time. For now, please make sure you are getting at least 2,000 units of Vit D Daily and we can recheck your Vit D levels in 6 weeks to see if your values are increasing. Please make an appointment with the lab to schedule that blood draw.     Good news! You tested negative for Hep C and HIV.       Resulted Orders   Vitamin D Deficiency   Result Value Ref Range    Vitamin D, Total (25-Hydroxy) 18 (L) 20 - 75 ug/L    Narrative    Season, race, dietary intake, and treatment affect the concentration of 25-hydroxy-Vitamin D. Values may decrease during winter months and increase during summer months. Values 20-29 ug/L may indicate Vitamin D insufficiency and values <20 ug/L may indicate Vitamin D deficiency.    Vitamin D determination is routinely performed by an immunoassay specific for 25 hydroxyvitamin D3.  If an individual is on vitamin D2(ergocalciferol) supplementation, please specify 25 OH vitamin D2 and D3 level determination by LCMSMS test VITD23.     Hepatitis C Screen Reflex to HCV RNA Quant and Genotype   Result Value Ref Range    Hepatitis C Antibody Nonreactive Nonreactive    Narrative    Assay performance characteristics have not been established for newborns, infants, and children.   HIV Antigen Antibody Combo   Result Value Ref Range    HIV Antigen Antibody Combo Nonreactive Nonreactive      Comment:      HIV-1 p24 Ag & HIV-1/HIV-2 Ab Not Detected       If you have any questions or  concerns, please call the clinic at the number listed above.       Sincerely,      Belem Perez MD

## 2023-03-30 NOTE — NURSING NOTE
Immunizations Administered     Name Date Dose VIS Date Route    COVID-19 Vaccine Bivalent Booster 18+ (Moderna) 3/30/23 11:59 AM 0.5 mL EUA,08/31/2022,Given today Intramuscular          After obtaining informed and written consent, the COVID-19 MODERNA BIVALENT BOOSTER immunization is given in the left deltoid. Patient verified all immunization questions prior to vaccination. Patient is remaining in clinic for 15 minutes to monitor for adverse reactions.    BRADLY Shaw LPN on 3/30/2023 at 12:03 PM

## 2023-03-31 DIAGNOSIS — E55.9 VITAMIN D DEFICIENCY: Primary | ICD-10-CM

## 2023-03-31 NOTE — RESULT ENCOUNTER NOTE
Don Tuttle,     It is a pleasure providing you with medical care. I have received and reviewed your lab results, and have the following recommendations:     You have mildly low Vitamin D levels. Normally I would send a prescription for Vit D supplements to be taking for 6-8 weeks. However, I know you recently started taking multivitamins so it is unclear if this will be necessary at this time. For now, please make sure you are getting at least 2,000 units of Vit D Daily and we can recheck your Vit D levels in 6 weeks to see if your values are increasing. Please make an appointment with the lab to schedule that blood draw.    Good news! You tested negative for Hep C and HIV.     Sincerely,     Belem Perez MD

## 2023-04-10 ENCOUNTER — HOSPITAL ENCOUNTER (OUTPATIENT)
Dept: PHYSICAL THERAPY | Facility: CLINIC | Age: 28
Setting detail: THERAPIES SERIES
Discharge: HOME | End: 2023-04-10
Attending: STUDENT IN AN ORGANIZED HEALTH CARE EDUCATION/TRAINING PROGRAM
Payer: COMMERCIAL

## 2023-04-10 DIAGNOSIS — G89.29 CHRONIC PAIN OF BOTH SHOULDERS: ICD-10-CM

## 2023-04-10 DIAGNOSIS — M25.511 CHRONIC PAIN OF BOTH SHOULDERS: ICD-10-CM

## 2023-04-10 DIAGNOSIS — M25.512 CHRONIC PAIN OF BOTH SHOULDERS: ICD-10-CM

## 2023-04-10 PROCEDURE — 97161 PT EVAL LOW COMPLEX 20 MIN: CPT | Mod: GP | Performed by: PHYSICAL THERAPIST

## 2023-04-10 PROCEDURE — 97110 THERAPEUTIC EXERCISES: CPT | Mod: GP | Performed by: PHYSICAL THERAPIST

## 2023-04-10 NOTE — PROGRESS NOTES
04/10/23 1100   General Information   Type of Visit Initial OP Ortho PT Evaluation   Start of Care Date 04/10/23   Referring Physician Belem Perez MD   Patient/Family Goals Statement To make my shoulders feel better, improve strength   Orders Evaluate and Treat   Date of Order 03/30/23   Certification Required? Yes   Medical Diagnosis chronic B shoulder pain   Body Part(s)   Body Part(s) Shoulder   Presentation and Etiology   Pertinent history of current problem (include personal factors and/or comorbidities that impact the POC) Pt presents w/ R > L shoulder mild pain and decreased ROM.  Pt notes R shoulder for a couple years and worsened w/ lifting boxes [employer].   Pt notes intermittent pain 3/10 deep in shoulder blade area.    Neg neck pain/ numbness/ tingling.   No tests.   No meds.  Pt is R dominant.  PMHX: smoker.  Low complexity   Impairments D. Decreased ROM   Onset date of current episode/exacerbation 03/30/23   Pain quality C. Aching   Pain exacerbation comment decreased ROM reaching behind back frank R.   Will notice it more w/ sitting.   Pain/symptoms eased by C. Rest;E. Changing positions;F. Certain positions   Progression of symptoms since onset: Worsened  (decreased ROM, mild increase in pain)   Prior Level of Function   Functional Level Prior Comment disc golf   Current Level of Function   Patient role/employment history A. 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 No   Have you fallen and had an injury in the past year? No   Is patient a fall risk? No   Abuse Screen (yes response referral indicated)   Feels Unsafe at Home or Work/School no   Feels Threatened by Someone no   Does Anyone Try to Keep You From Having Contact with Others or Doing Things Outside Your Home? no   Physical Signs of Abuse Present no   Shoulder Objective Findings   Side (if bilateral, select both right  and left) Right   Cervical Screen (ROM, quadrant) flex/ ext/ B rot WNL no complaints   Scapulothoracic Rhythm WNL B   Pec Minor (supine) Flexibility mild tightness L > R   Neer's Test neg B   Jacob-Ruel Test R neg, L neg   Coracoid Test R neg, L +   Crossover Test R --bothered shoulder blade, L neg   Shoulder Special Tests Comments Lift off and empty can neg B   Palpation mild tenderness R AC joint otherwise neg B shoulder.   Accessory Motion/Joint Mobility Hypomobile thoracic spine.   Posture slight FH,  L shoulder elevated   Right Shoulder Flexion AROM R 150*, L 145*   Right Shoulder Flexion PROM R 167*,  L 160*   Right Shoulder Abduction AROM R 150*, L 157*   Right Shoulder Abduction PROM B  WNL   Right Shoulder ER AROM 75* B   Right Shoulder ER PROM B WNL   Right Shoulder IR AROM R to T8,  L to T4.   Right Shoulder IR PROM R 63* in 90* ABD,  L 75* in 90 * abd   Right Shoulder Flexion Strength R 4+/5, L 5/5   Right Shoulder Abduction Strength R 4+/5, L 5/5   Right Shoulder ER Strength R 5-/5, L 5/5   Right Shoulder IR Strength B 5/5   Right Mid Trapezius Strength R 4+/5, L 4-/5   Right Lower Trapezius Strength R 4-/5,  L 3/5   Planned Therapy Interventions   Planned Therapy Interventions manual therapy;ROM;strengthening;stretching;neuromuscular re-education   Clinical Impression   Criteria for Skilled Therapeutic Interventions Met yes, treatment indicated   PT Diagnosis B shoulder pain/ stiffness   Influenced by the following impairments pain, stiffness, decreased strength   Functional limitations due to impairments reaching overhead, reaching behind back, will notice pain w/ sitting   Clinical Presentation Stable/Uncomplicated   Clinical Presentation Rationale pt notes symptoms have increased but no major changes   Clinical Decision Making (Complexity) Low complexity   Therapy Frequency other (see comments)   Predicted Duration of Therapy Intervention (days/wks) 1X/ 7-10 days x 6 visits   Risk & Benefits of  therapy have been explained Yes   Patient, Family & other staff in agreement with plan of care Yes   Education Assessment   Preferred Learning Style Listening;Reading;Demonstration;Pictures/video   Barriers to Learning No barriers   ORTHO GOALS   PT Ortho Eval Goals 1;2;3;4   Ortho Goal 1   Goal Identifier 1.   Goal Description Pt will be able to reach up behind back w/ R hand to T5 for increased ease w/ ADL's   Target Date 06/09/23   Ortho Goal 2   Goal Identifier 2.   Goal Description Pt will be able to reach overhead to 160* B for increased ease w/ overhead activity   Target Date 06/09/23   Ortho Goal 3   Goal Identifier 3.   Goal Description Pt will hve increased R shoulder strength to 5/5 for increased tolerance to normal activity   Target Date 06/09/23   Ortho Goal 4   Goal Identifier 4.   Goal Description Pt will be independent and consistent w/HEP to manage symptoms   Target Date 06/09/23   Total Evaluation Time   PT Eval, Low Complexity Minutes (05892) 25   Therapy Certification   Certification date from 04/10/23   Certification date to 06/09/23   Medical Diagnosis chronic B shoulder pain     Thank you for this referral,    Toyin Underwood, PT,   #[id number]  [facility]  [phone]

## 2023-04-10 NOTE — PROGRESS NOTES
[facility]    OUTPATIENT PHYSICAL THERAPY ORTHOPEDIC EVALUATION  PLAN OF TREATMENT FOR OUTPATIENT REHABILITATION  (COMPLETE FOR INITIAL CLAIMS ONLY)  Patient's Last Name, First Name, M.I.  YOB: 1995  Edvin Tuttle  GABE    Provider s Name:  [facility]   Medical Record No.  [id number]   Start of Care Date:  04/10/23   Onset Date:  03/30/23   Type:     _X__PT   ___OT   ___SLP Medical Diagnosis:  (P) chronic B shoulder pain     PT Diagnosis:  B shoulder pain/ stiffness   Visits from SOC:  1      _________________________________________________________________________________  Plan of Treatment/Functional Goals:  manual therapy, ROM, strengthening, stretching, neuromuscular re-education      Goals  Goal Identifier: 1.  Goal Description: Pt will be able to reach up behind back w/ R hand to T5 for increased ease w/ ADL's  Target Date: 06/09/23    Goal Identifier: 2.  Goal Description: Pt will be able to reach overhead to 160* B for increased ease w/ overhead activity  Target Date: 06/09/23    Goal Identifier: 3.  Goal Description: (P) Pt will hve increased R shoulder strength to 5/5 for increased tolerance to normal activity  Target Date: (P) 06/09/23    Goal Identifier: (P) 4.  Goal Description: (P) Pt will be independent and consistent w/HEP to manage symptoms  Target Date: (P) 06/09/23       Therapy Frequency:  other (see comments)  Predicted Duration of Therapy Intervention:  1X/ 7-10 days x 6 visits    Toyin Underwood, PT                 I CERTIFY THE NEED FOR THESE SERVICES FURNISHED UNDER        THIS PLAN OF TREATMENT AND WHILE UNDER MY CARE     (Physician co-signature of this document indicates review and certification of the therapy plan).                     Certification Date From:  (P) 04/10/23   Certification Date To:  (P) 06/09/23    Referring Provider:   Belem Perez MD    Initial Assessment        See Epic Evaluation Start of Care Date: 04/10/23

## 2023-05-03 NOTE — PROGRESS NOTES
Discharge Note -Physical Therapy    NAME:  Edvin Tuttle  MRN:   [id number]       Pt did not follow up for therapy as recommended. No further contacts have been made. Initial evaluation note will serve as final entry. Discharge from PT this date.    Thank you for this referral,    Toyin Underwood, PT,   #[id number]  [facility]  [phone]

## 2024-06-16 ENCOUNTER — HEALTH MAINTENANCE LETTER (OUTPATIENT)
Age: 29
End: 2024-06-16

## 2025-06-21 ENCOUNTER — HEALTH MAINTENANCE LETTER (OUTPATIENT)
Age: 30
End: 2025-06-21